# Patient Record
Sex: MALE | Race: WHITE | NOT HISPANIC OR LATINO | Employment: OTHER | URBAN - METROPOLITAN AREA
[De-identification: names, ages, dates, MRNs, and addresses within clinical notes are randomized per-mention and may not be internally consistent; named-entity substitution may affect disease eponyms.]

---

## 2021-01-04 ENCOUNTER — HISTORICAL (OUTPATIENT)
Dept: ADMINISTRATIVE | Facility: HOSPITAL | Age: 56
End: 2021-01-04

## 2021-01-04 LAB
CK SERPL-CCNC: 69 U/L (ref 30–200)
HCO3 UR-SCNC: 27.9 MMOL/L (ref 22–26)
O2 HGB ARTERIAL: 94.1 % (ref 94–97)
PCO2 BLDA: 42 MMHG (ref 35–45)
PH SMN: 7.43 [PH] (ref 7.35–7.45)
PO2 BLDA: 79 MMHG (ref 80–100)
POC ALLENS TEST: ABNORMAL
POC BE: 3.2 (ref -2–3)
POC CAO2: 16.3 ML/DL (ref 17.6–24.3)
POC CO HGB: 1.6 %
POC CO2: 29.2 MMOL/L (ref 22–27)
POC IONIZED CALCIUM: 1.14 MMOL/L (ref 1.12–1.23)
POC MET HGB: 1 % (ref 0.4–1.5)
POC SAMPLESOURCE: ABNORMAL
POC SATURATED O2: 95.9 % (ref 96–97)
POC SITE: ABNORMAL
POC THB: 12.3 GM/DL (ref 13.5–18)
POC TREATMENT: ABNORMAL
POTASSIUM BLD-SCNC: 4 MMOL/L (ref 3.6–5)
SODIUM BLD-SCNC: 134 MMOL/L (ref 137–145)
TSH SERPL-ACNC: 4.09 UIU/ML (ref 0.35–4.94)

## 2022-04-11 ENCOUNTER — HISTORICAL (OUTPATIENT)
Dept: ADMINISTRATIVE | Facility: HOSPITAL | Age: 57
End: 2022-04-11
Payer: COMMERCIAL

## 2022-04-28 VITALS
BODY MASS INDEX: 42.66 KG/M2 | DIASTOLIC BLOOD PRESSURE: 90 MMHG | WEIGHT: 315 LBS | SYSTOLIC BLOOD PRESSURE: 154 MMHG | HEIGHT: 72 IN

## 2022-05-02 ENCOUNTER — TELEPHONE (OUTPATIENT)
Dept: NEUROLOGY | Facility: CLINIC | Age: 57
End: 2022-05-02
Payer: COMMERCIAL

## 2022-05-02 NOTE — TELEPHONE ENCOUNTER
S/w pharm; aware that the modafinil PA w req for #60/30 (that they initiated to provider through [facility]) was approved; she states that they were able to process 30 for 15 but not the full Rx; suggested that they call help desk w insurance for any issues

## 2022-08-15 DIAGNOSIS — G47.30 SLEEP APNEA, UNSPECIFIED TYPE: Primary | ICD-10-CM

## 2022-08-15 RX ORDER — MODAFINIL 200 MG/1
200 TABLET ORAL 2 TIMES DAILY
Qty: 60 TABLET | Refills: 5 | Status: SHIPPED | OUTPATIENT
Start: 2022-08-15 | End: 2022-09-14

## 2022-08-15 RX ORDER — MODAFINIL 200 MG/1
200 TABLET ORAL 2 TIMES DAILY
COMMUNITY
Start: 2022-07-26 | End: 2022-08-15 | Stop reason: SDUPTHER

## 2022-08-15 NOTE — TELEPHONE ENCOUNTER
Spoke with patient. States he is leaving for work on Wednesday and is out of medication. Asking that medication be sent to [facility] pharmacy in [place] today.

## 2022-08-15 NOTE — TELEPHONE ENCOUNTER
----- Message from Nathalia Bella sent at 8/15/2022  9:00 AM CDT -----  Regarding: med refill  CallType: Patient Call  To: [facility]   From: Markie Mcl   Phone: [phone]   Patient name: Same   : 2. 2. 65   Reg Dr: Dr Dmitry Roberts   Ref: refill on meds - pharmacy has  been waiting    Clr ID: [id number]    --------------------------------------  Message History  Account: [id number]  Taken:  Mon 15-Aug-2022  7:41a TAB  Serial#: 1

## 2022-09-13 RX ORDER — GABAPENTIN 600 MG/1
600 TABLET ORAL 3 TIMES DAILY
COMMUNITY
Start: 2022-07-26

## 2022-09-13 RX ORDER — DEXTROAMPHETAMINE SACCHARATE, AMPHETAMINE ASPARTATE, DEXTROAMPHETAMINE SULFATE AND AMPHETAMINE SULFATE 5; 5; 5; 5 MG/1; MG/1; MG/1; MG/1
1 TABLET ORAL DAILY
COMMUNITY
Start: 2022-07-22 | End: 2022-10-17 | Stop reason: SDUPTHER

## 2022-09-13 RX ORDER — PHENTERMINE HYDROCHLORIDE 37.5 MG/1
TABLET ORAL
COMMUNITY
Start: 2022-01-12 | End: 2022-10-17 | Stop reason: SDUPTHER

## 2022-10-17 ENCOUNTER — OFFICE VISIT (OUTPATIENT)
Dept: NEUROLOGY | Facility: CLINIC | Age: 57
End: 2022-10-17
Payer: COMMERCIAL

## 2022-10-17 VITALS
BODY MASS INDEX: 34.88 KG/M2 | DIASTOLIC BLOOD PRESSURE: 82 MMHG | SYSTOLIC BLOOD PRESSURE: 124 MMHG | HEIGHT: 71 IN | WEIGHT: 249.13 LBS

## 2022-10-17 DIAGNOSIS — Z98.84 H/O GASTRIC BYPASS: ICD-10-CM

## 2022-10-17 DIAGNOSIS — E66.9 OBESITY (BMI 30.0-34.9): Primary | ICD-10-CM

## 2022-10-17 DIAGNOSIS — G47.33 OSA (OBSTRUCTIVE SLEEP APNEA): Primary | ICD-10-CM

## 2022-10-17 DIAGNOSIS — G70.9 NEUROMUSCULAR WEAKNESS: ICD-10-CM

## 2022-10-17 DIAGNOSIS — G47.19 EXCESSIVE DAYTIME SLEEPINESS: ICD-10-CM

## 2022-10-17 PROCEDURE — 3074F SYST BP LT 130 MM HG: CPT | Mod: CPTII,S$GLB,, | Performed by: NURSE PRACTITIONER

## 2022-10-17 PROCEDURE — 1159F PR MEDICATION LIST DOCUMENTED IN MEDICAL RECORD: ICD-10-PCS | Mod: CPTII,S$GLB,, | Performed by: NURSE PRACTITIONER

## 2022-10-17 PROCEDURE — 1160F PR REVIEW ALL MEDS BY PRESCRIBER/CLIN PHARMACIST DOCUMENTED: ICD-10-PCS | Mod: CPTII,S$GLB,, | Performed by: NURSE PRACTITIONER

## 2022-10-17 PROCEDURE — 3079F DIAST BP 80-89 MM HG: CPT | Mod: CPTII,S$GLB,, | Performed by: NURSE PRACTITIONER

## 2022-10-17 PROCEDURE — 99213 OFFICE O/P EST LOW 20 MIN: CPT | Mod: S$GLB,,, | Performed by: NURSE PRACTITIONER

## 2022-10-17 PROCEDURE — 3074F PR MOST RECENT SYSTOLIC BLOOD PRESSURE < 130 MM HG: ICD-10-PCS | Mod: CPTII,S$GLB,, | Performed by: NURSE PRACTITIONER

## 2022-10-17 PROCEDURE — 99999 PR PBB SHADOW E&M-EST. PATIENT-LVL IV: CPT | Mod: PBBFAC,,, | Performed by: NURSE PRACTITIONER

## 2022-10-17 PROCEDURE — 3079F PR MOST RECENT DIASTOLIC BLOOD PRESSURE 80-89 MM HG: ICD-10-PCS | Mod: CPTII,S$GLB,, | Performed by: NURSE PRACTITIONER

## 2022-10-17 PROCEDURE — 99999 PR PBB SHADOW E&M-EST. PATIENT-LVL IV: ICD-10-PCS | Mod: PBBFAC,,, | Performed by: NURSE PRACTITIONER

## 2022-10-17 PROCEDURE — 1159F MED LIST DOCD IN RCRD: CPT | Mod: CPTII,S$GLB,, | Performed by: NURSE PRACTITIONER

## 2022-10-17 PROCEDURE — 1160F RVW MEDS BY RX/DR IN RCRD: CPT | Mod: CPTII,S$GLB,, | Performed by: NURSE PRACTITIONER

## 2022-10-17 PROCEDURE — 99213 PR OFFICE/OUTPT VISIT, EST, LEVL III, 20-29 MIN: ICD-10-PCS | Mod: S$GLB,,, | Performed by: NURSE PRACTITIONER

## 2022-10-17 RX ORDER — CYANOCOBALAMIN 1000 UG/ML
INJECTION, SOLUTION INTRAMUSCULAR; SUBCUTANEOUS
COMMUNITY
Start: 2022-10-08

## 2022-10-17 RX ORDER — DEXTROAMPHETAMINE SACCHARATE, AMPHETAMINE ASPARTATE, DEXTROAMPHETAMINE SULFATE AND AMPHETAMINE SULFATE 5; 5; 5; 5 MG/1; MG/1; MG/1; MG/1
1 TABLET ORAL DAILY
Qty: 30 TABLET | Refills: 0 | Status: SHIPPED | OUTPATIENT
Start: 2022-10-17 | End: 2023-02-08 | Stop reason: SDUPTHER

## 2022-10-17 RX ORDER — MELOXICAM 15 MG/1
15 TABLET ORAL DAILY
COMMUNITY
Start: 2022-10-12

## 2022-10-17 RX ORDER — PHENTERMINE HYDROCHLORIDE 37.5 MG/1
TABLET ORAL
Qty: 60 TABLET | Refills: 0 | Status: SHIPPED | OUTPATIENT
Start: 2022-10-17 | End: 2022-12-12 | Stop reason: SDUPTHER

## 2022-10-17 RX ORDER — MODAFINIL 200 MG/1
200 TABLET ORAL 2 TIMES DAILY
Qty: 60 TABLET | Refills: 0 | Status: SHIPPED | OUTPATIENT
Start: 2022-10-17 | End: 2022-11-28

## 2022-10-17 RX ORDER — VILAZODONE HYDROCHLORIDE 40 MG/1
40 TABLET ORAL DAILY
COMMUNITY
Start: 2022-09-08

## 2022-10-17 RX ORDER — PSEUDOEPHEDRINE HCL 30 MG
250 TABLET ORAL
COMMUNITY
Start: 2022-04-12

## 2022-10-17 RX ORDER — ERGOCALCIFEROL 1.25 MG/1
50000 CAPSULE ORAL
COMMUNITY
Start: 2022-07-18

## 2022-10-17 RX ORDER — ACARBOSE 25 MG/1
25 TABLET ORAL 3 TIMES DAILY
COMMUNITY
Start: 2022-09-21

## 2022-10-17 RX ORDER — MODAFINIL 200 MG/1
200 TABLET ORAL 2 TIMES DAILY
COMMUNITY
Start: 2022-10-07 | End: 2022-10-17 | Stop reason: SDUPTHER

## 2022-10-17 NOTE — ASSESSMENT & PLAN NOTE
Encouraged continued use of PAP. Drowsy driving may still occur despite PAP use. Clinical follow up and replacement of supplies discussed.    DME: [facility]; discussed trilogy settings with RT Lexi Segovia during visit.     Patient transitioning to Medicare on 11/01/2022 and will need a visit with us at that time so I ask that he come back mid 12/2022 for face to face OV and new rx for triology.    FU in 2 months

## 2022-10-17 NOTE — PROGRESS NOTES
Subjective:           Patient ID: Markie Ashby is a 57 y.o. male.    Chief Complaint: Sleep Apnea     HPI:            Follow up 6 month; Trilogy beneficial to patient. Tolerating pressure well since change during last office visit. Pt feels refreshed when awakening. Denies difficulty falling asleep and reports less EDS. No issues with Trilogy.     Current Trilogy settings - changed/(optimized) 05/13/2022    Peep 5-8  PS 5-20 with max 25      Would like to discuss poss SS since weight loss; s/p gastric surgery; has lost 201 pounds; current weight 249!    EDS:  Provigil 200 mg BID  Adderall 20 mg qday  Phentermine 37.5 mg BID    EPSS on med:  EPWORTH SLEEPINESS SCALE 10/17/2022   Sitting and reading 1   Watching TV 2   Sitting, inactive in a public place (e.g. a theatre or a meeting) 2   As a passenger in a car for an hour without a break 3   Lying down to rest in the afternoon when circumstances permit 3   Sitting and talking to someone 1   Sitting quietly after a lunch without alcohol 2   In a car, while stopped for a few minutes in traffic 1   Total score 15     ROS: as per HPI, otherwise pertinent systems review is negative          Past Medical History:   Diagnosis Date    Diabetes mellitus     JOLENE (obstructive sleep apnea)        Past Surgical History:   Procedure Laterality Date    APPENDECTOMY      CHOLECYSTECTOMY      KYPHOSIS SURGERY      VASECTOMY         Family History   Problem Relation Age of Onset    Diabetes Mother     Cancer Brother        Social History     Socioeconomic History    Marital status:    Tobacco Use    Smoking status: Never    Smokeless tobacco: Never   Substance and Sexual Activity    Alcohol use: Never    Drug use: Never       Review of patient's allergies indicates:   Allergen Reactions    Oxycodone      Other reaction(s): haullincante    Penicillins Rash         Current Outpatient Medications:     acarbose (PRECOSE) 25 MG Tab, Take 25 mg by mouth 3 (three) times daily.,  "Disp: , Rfl:     calcium citrate 250 mg calcium Tab, Take 250 mg by mouth., Disp: , Rfl:     cyanocobalamin 1,000 mcg/mL injection, SMARTSI MCG IM, Disp: , Rfl:     ergocalciferol (ERGOCALCIFEROL) 50,000 unit Cap, Take 50,000 Units by mouth every 7 days., Disp: , Rfl:     gabapentin (NEURONTIN) 600 MG tablet, Take 600 mg by mouth 3 (three) times daily., Disp: , Rfl:     meloxicam (MOBIC) 15 MG tablet, Take 15 mg by mouth once daily., Disp: , Rfl:     phentermine (ADIPEX-P) 37.5 mg tablet,  See Instructions, TAKE ONE TABLET BY MOUTH TWICE DAILY, # 60 tab(s), 5 Refill(s), Pharmacy: [facility], 184, cm, Height/Length Dosing, 22 6:47:00 CST, 144.69, kg, Weight Dosing, 22 6:47:00 CST, Disp: , Rfl:     vilazodone (VIIBRYD) 40 mg Tab tablet, Take 40 mg by mouth once daily., Disp: , Rfl:     dextroamphetamine-amphetamine (ADDERALL) 20 mg tablet, Take 1 tablet by mouth once daily., Disp: 30 tablet, Rfl: 0    modafiniL (PROVIGIL) 200 MG Tab, Take 1 tablet (200 mg total) by mouth 2 (two) times daily., Disp: 60 tablet, Rfl: 0         Objective:      Exam:   /82 (BP Location: Right arm, Patient Position: Sitting)   Ht 5' 11" (1.803 m)   BMI 44.49 kg/m²     Physical Exam  Vitals reviewed.   Constitutional:       Appearance: Normal appearance.      Accompanied by: alone  HENT:      Ears:      Comments: Hearing normal.  Eyes:      Extraocular Movements: Extraocular movements intact.   Cardiovascular:      Rate and Rhythm: Normal rate and regular rhythm.   Pulmonary:      Effort: Pulmonary effort is normal.      Breath sounds: Normal breath sounds.   Musculoskeletal:         General: Normal range of motion.   Skin:     General: Skin is warm and dry.   Neurological:      General: No focal deficit present.      Mental Status: He is alert and oriented to person, place, and time.      Gait standing rollator  Psychiatric:         Mood and Affect: Mood normal.         Behavior: Behavior normal. "         Assessment/Plan:     Problem List Items Addressed This Visit          Neuro    Neuromuscular weakness       Endocrine    H/O gastric bypass       Other    JOLENE (obstructive sleep apnea) - Primary    Current Assessment & Plan     Encouraged continued use of PAP. Drowsy driving may still occur despite PAP use. Clinical follow up and replacement of supplies discussed.    DME: [facility]; discussed trilogy settings with RT Lexi Segovia during visit.     Patient transitioning to Medicare on 11/01/2022 and will need a visit with us at that time so I ask that he come back mid 12/2022 for face to face OV and new rx for triology.    FU in 2 months            Excessive daytime sleepiness    Current Assessment & Plan     Advise he not drive if too drowsy    Continue the Adderall, Provigil, and Phentermine [phentermine rx from Dr. Roberts]              Juventino Kelley, MSN, APRN, AGACNP-BC

## 2022-10-17 NOTE — ASSESSMENT & PLAN NOTE
Advise he not drive if too drowsy    Continue the Adderall, Provigil, and Phentermine [phentermine rx from Dr. Roberts]

## 2022-12-12 DIAGNOSIS — E66.9 OBESITY (BMI 30.0-34.9): ICD-10-CM

## 2022-12-13 RX ORDER — PHENTERMINE HYDROCHLORIDE 37.5 MG/1
TABLET ORAL
Qty: 60 TABLET | Refills: 0 | Status: SHIPPED | OUTPATIENT
Start: 2022-12-13 | End: 2023-01-11

## 2023-01-11 DIAGNOSIS — E66.9 OBESITY (BMI 30.0-34.9): ICD-10-CM

## 2023-01-11 RX ORDER — PHENTERMINE HYDROCHLORIDE 37.5 MG/1
TABLET ORAL
Qty: 60 TABLET | Refills: 0 | Status: SHIPPED | OUTPATIENT
Start: 2023-01-11 | End: 2023-02-08 | Stop reason: SDUPTHER

## 2023-01-27 DIAGNOSIS — G47.33 OSA (OBSTRUCTIVE SLEEP APNEA): ICD-10-CM

## 2023-01-27 DIAGNOSIS — G47.19 EXCESSIVE DAYTIME SLEEPINESS: ICD-10-CM

## 2023-01-27 RX ORDER — MODAFINIL 200 MG/1
200 TABLET ORAL 2 TIMES DAILY
Qty: 60 TABLET | Refills: 3 | Status: SHIPPED | OUTPATIENT
Start: 2023-01-27 | End: 2023-02-08 | Stop reason: SDUPTHER

## 2023-02-08 ENCOUNTER — OFFICE VISIT (OUTPATIENT)
Dept: NEUROLOGY | Facility: CLINIC | Age: 58
End: 2023-02-08
Payer: COMMERCIAL

## 2023-02-08 VITALS
BODY MASS INDEX: 32.9 KG/M2 | WEIGHT: 235 LBS | DIASTOLIC BLOOD PRESSURE: 72 MMHG | SYSTOLIC BLOOD PRESSURE: 124 MMHG | HEIGHT: 71 IN

## 2023-02-08 DIAGNOSIS — G70.9 NEUROMUSCULAR WEAKNESS: ICD-10-CM

## 2023-02-08 DIAGNOSIS — E66.9 OBESITY (BMI 30.0-34.9): ICD-10-CM

## 2023-02-08 DIAGNOSIS — G47.33 OSA (OBSTRUCTIVE SLEEP APNEA): Primary | ICD-10-CM

## 2023-02-08 DIAGNOSIS — Z98.84 H/O GASTRIC BYPASS: ICD-10-CM

## 2023-02-08 DIAGNOSIS — G47.19 EXCESSIVE DAYTIME SLEEPINESS: ICD-10-CM

## 2023-02-08 PROCEDURE — 3078F DIAST BP <80 MM HG: CPT | Mod: CPTII,S$GLB,, | Performed by: NURSE PRACTITIONER

## 2023-02-08 PROCEDURE — 1159F MED LIST DOCD IN RCRD: CPT | Mod: CPTII,S$GLB,, | Performed by: NURSE PRACTITIONER

## 2023-02-08 PROCEDURE — 99215 OFFICE O/P EST HI 40 MIN: CPT | Mod: S$GLB,,, | Performed by: NURSE PRACTITIONER

## 2023-02-08 PROCEDURE — 3008F BODY MASS INDEX DOCD: CPT | Mod: CPTII,S$GLB,, | Performed by: NURSE PRACTITIONER

## 2023-02-08 PROCEDURE — 3074F SYST BP LT 130 MM HG: CPT | Mod: CPTII,S$GLB,, | Performed by: NURSE PRACTITIONER

## 2023-02-08 PROCEDURE — 3074F PR MOST RECENT SYSTOLIC BLOOD PRESSURE < 130 MM HG: ICD-10-PCS | Mod: CPTII,S$GLB,, | Performed by: NURSE PRACTITIONER

## 2023-02-08 PROCEDURE — 3008F PR BODY MASS INDEX (BMI) DOCUMENTED: ICD-10-PCS | Mod: CPTII,S$GLB,, | Performed by: NURSE PRACTITIONER

## 2023-02-08 PROCEDURE — 3078F PR MOST RECENT DIASTOLIC BLOOD PRESSURE < 80 MM HG: ICD-10-PCS | Mod: CPTII,S$GLB,, | Performed by: NURSE PRACTITIONER

## 2023-02-08 PROCEDURE — 99999 PR PBB SHADOW E&M-EST. PATIENT-LVL III: ICD-10-PCS | Mod: PBBFAC,,, | Performed by: NURSE PRACTITIONER

## 2023-02-08 PROCEDURE — 99215 PR OFFICE/OUTPT VISIT, EST, LEVL V, 40-54 MIN: ICD-10-PCS | Mod: S$GLB,,, | Performed by: NURSE PRACTITIONER

## 2023-02-08 PROCEDURE — 99999 PR PBB SHADOW E&M-EST. PATIENT-LVL III: CPT | Mod: PBBFAC,,, | Performed by: NURSE PRACTITIONER

## 2023-02-08 PROCEDURE — 1159F PR MEDICATION LIST DOCUMENTED IN MEDICAL RECORD: ICD-10-PCS | Mod: CPTII,S$GLB,, | Performed by: NURSE PRACTITIONER

## 2023-02-08 RX ORDER — PHENTERMINE HYDROCHLORIDE 37.5 MG/1
TABLET ORAL
Qty: 60 TABLET | Refills: 5 | Status: SHIPPED | OUTPATIENT
Start: 2023-02-08 | End: 2023-05-09 | Stop reason: SDUPTHER

## 2023-02-08 RX ORDER — DEXTROAMPHETAMINE SACCHARATE, AMPHETAMINE ASPARTATE, DEXTROAMPHETAMINE SULFATE AND AMPHETAMINE SULFATE 5; 5; 5; 5 MG/1; MG/1; MG/1; MG/1
1 TABLET ORAL DAILY
Qty: 30 TABLET | Refills: 0 | Status: SHIPPED | OUTPATIENT
Start: 2023-02-08

## 2023-02-08 RX ORDER — MODAFINIL 200 MG/1
200 TABLET ORAL 2 TIMES DAILY
Qty: 60 TABLET | Refills: 5 | Status: SHIPPED | OUTPATIENT
Start: 2023-02-08 | End: 2023-05-09 | Stop reason: SDUPTHER

## 2023-02-08 NOTE — PROGRESS NOTES
Established JOLENE Patient   SUBJECTIVE:    Patient ID: Markie Ashby , 58 y.o.    Past Medical History:   Diagnosis Date    Diabetes mellitus     JOLENE (obstructive sleep apnea)        Past Surgical History:   Procedure Laterality Date    APPENDECTOMY      CHOLECYSTECTOMY      KYPHOSIS SURGERY      VASECTOMY         Family History   Problem Relation Age of Onset    Diabetes Mother     Cancer Brother        Social History     Socioeconomic History    Marital status:    Tobacco Use    Smoking status: Never    Smokeless tobacco: Never   Substance and Sexual Activity    Alcohol use: Never    Drug use: Never   Social History Narrative    ** Merged History Encounter **            Review of patient's allergies indicates:   Allergen Reactions    Oxycodone      Other reaction(s): haullincante    Thiopental sodium      Other reaction(s): Not Indicated    Penicillins Rash       Chief Complaint: Here for jolene f/u, on trilogy     History of Present Illness:     Follow up 6 month; Trilogy beneficial to patient.  Denies problems with trilogy machine. Able to get supplies    Current Trilogy settings - changed/(optimized) 2022    Peep 5-8  PS 5-20 with max 25      Reports EDS is well controlled with medication  EDS:  Provigil 200 mg BID  Adderall 20 mg qday  Phentermine 37.5 mg BID    Had weight loss surgery in 10/2021. Has lost 210 lbs!    Had fall; awaiting spine surgery 2023 with     Review of Systems - as per HPI, otherwise a balanced 10 systems review is negative.      Current Medications:    Current Outpatient Medications:     calcium citrate 250 mg calcium Tab, Take 250 mg by mouth., Disp: , Rfl:     cyanocobalamin 1,000 mcg/mL injection, SMARTSI MCG IM, Disp: , Rfl:     dextroamphetamine-amphetamine (ADDERALL) 20 mg tablet, Take 1 tablet by mouth once daily., Disp: 30 tablet, Rfl: 0    ergocalciferol (ERGOCALCIFEROL) 50,000 unit Cap, Take 50,000 Units by mouth every 7 days., Disp: , Rfl:  "    gabapentin (NEURONTIN) 600 MG tablet, Take 600 mg by mouth 3 (three) times daily., Disp: , Rfl:     meloxicam (MOBIC) 15 MG tablet, Take 15 mg by mouth once daily., Disp: , Rfl:     modafiniL (PROVIGIL) 200 MG Tab, Take 1 tablet (200 mg total) by mouth 2 (two) times daily., Disp: 60 tablet, Rfl: 3    multivitamin with minerals tablet, Take 1 tablet by mouth., Disp: , Rfl:     phentermine (ADIPEX-P) 37.5 mg tablet, SEE INSTRUCTIONS, TAKE ONE TABLET BY MOUTH TWICE DAILY, # 60 TAB(S), 5 REFILL(S), PHARMACY: [facility], 184, CM, HEIGHT<MORE>, Disp: 60 tablet, Rfl: 0    vilazodone (VIIBRYD) 40 mg Tab tablet, Take 40 mg by mouth once daily., Disp: , Rfl:     acarbose (PRECOSE) 25 MG Tab, Take 25 mg by mouth 3 (three) times daily., Disp: , Rfl:       OBJECTIVE:    Vitals:  /72 (BP Location: Left arm, Patient Position: Sitting)   Ht 5' 11" (1.803 m)   Wt 106.6 kg (235 lb)   BMI 32.78 kg/m²      Physical Exam:  Constitutional  he appears well-developed and well-nourished. he is well groomed.    Accompanied by - self  Appearance -  no apparent distress  Heart - RRR auscultated without murmur  Lungs - CTA   Skin- wearing back brace    Neurologic  Cortical function - The patient is alert, attentive, and oriented  Speech - clear   Cranial nerves:  CN 3, 4, 6 EOMs - normal. No ptosis or lateral gaze deviation  CN 7 - no face asymmetry; normal eye closure and smile  CN 8 - hearing is grossly normal  Motor - grossly normal  Gait - walks with cane    Review of Data:      PAP Compliance Report  Last 30 days  Usage-  Usage > 4 hrs -  AHI -    EPWORTH SLEEPINESS SCALE 2/8/2023   Sitting and reading 1   Watching TV 2   Sitting, inactive in a public place (e.g. a theatre or a meeting) 1   As a passenger in a car for an hour without a break 1   Lying down to rest in the afternoon when circumstances permit 2   Sitting and talking to someone 1   Sitting quietly after a lunch without alcohol 2   In a car, while " stopped for a few minutes in traffic 1   Total score 11       Labs:  No visits with results within 3 Month(s) from this visit.   Latest known visit with results is:   Historical on 01/04/2021   Component Date Value Ref Range Status    POC CO HGB 01/04/2021 001.6  % Final    POC Potassium 01/04/2021 4.00  3.60 - 5.00 mmol/L Final    POC PH 01/04/2021 7.430  7.350 - 7.450 Final    POC BE 01/04/2021 3.2 (H)  -2.0 - 3.0 Final    POC MET HGB 01/04/2021 1.0  0.4 - 1.5 % Final    POC Ionized Calcium 01/04/2021 1.14  1.12 - 1.23 mmol/L Final    POC Sodium 01/04/2021 134.0 (L)  137.0 - 145.0 mmol/L Final    Sample Source 01/04/2021 art   Final    POC PCO2 01/04/2021 42.0  35.0 - 45.0 mmHg Final    POC SATURATED O2 01/04/2021 95.9 (L)  96.0 - 97.0 % Final    POC CAO2 01/04/2021 16.3 (L)  17.6 - 24.3 mL/dL Final    Allens Test 01/04/2021 N/A   Final    POC PO2 01/04/2021 79.0 (L)  80.0 - 100.0 mmHg Final    POC THb 01/04/2021 12.3 (L)  13.5 - 18.0 gm/dL Final    Site 01/04/2021 Brachial Lt   Final    POC HCO3 01/04/2021 27.9 (H)  22.0 - 26.0 mmol/L Final    O2 HGB, Arterial 01/04/2021 94.1  94.0 - 97.0 % Final    POC TREATMENT 01/04/2021 RA   Final    POC CO2 01/04/2021 29.2 (H)  22.0 - 27.0 mmol/L Final    Thyroid Stimulating Hormone 01/04/2021 4.0880  0.3500 - 4.9400 uIU/mL Final    Creatine Kinase 01/04/2021 69  30 - 200 U/L Final          ASSESSMENT /PLAN:    Problem List Items Addressed This Visit          Neuro    Neuromuscular weakness    - Awaiting spine surgery 6/7/2023 with   - Fall precautions discussed         Endocrine    H/O gastric bypass    - Keep up great work with weight loss!         Other    JOLENE (obstructive sleep apnea) - Primary    - Encouraged continued use of PAP.   - Drowsy driving may still occur despite PAP use.       Excessive daytime sleepiness    Continue medication for EDS:  Provigil 200 mg BID  Adderall 20 mg qday  Phentermine 37.5 mg BID           Questions and concerns were sought and  answered to the patient's stated verbal satisfaction.    The patient verbalizes understanding and agreement with the above stated treatment plan.   Dr. Roberts was available during today's encounter.     Items discussed include acute and/or chronic neurological, sleep, or other issues and their attendant differential diagnoses.  Potential for additional testing, treatment options, and prognosis also discussed.    ___single dx _*__multiple issues/ diagnoses  ___ low __ mod __  *_ high complexity of data  ___low __mod __*_ high risks     Medical Decision Making (MDM) used for CPT choice:  ___low  ___moderate  __  *__high        TONJA ManceraC  [facility]  [phone]

## 2023-05-09 ENCOUNTER — OFFICE VISIT (OUTPATIENT)
Dept: NEUROLOGY | Facility: CLINIC | Age: 58
End: 2023-05-09
Payer: MEDICARE

## 2023-05-09 VITALS
BODY MASS INDEX: 34.44 KG/M2 | DIASTOLIC BLOOD PRESSURE: 72 MMHG | HEIGHT: 71 IN | SYSTOLIC BLOOD PRESSURE: 134 MMHG | WEIGHT: 246 LBS

## 2023-05-09 DIAGNOSIS — E66.9 OBESITY (BMI 30.0-34.9): ICD-10-CM

## 2023-05-09 DIAGNOSIS — G47.19 EXCESSIVE DAYTIME SLEEPINESS: ICD-10-CM

## 2023-05-09 DIAGNOSIS — Z98.84 H/O GASTRIC BYPASS: ICD-10-CM

## 2023-05-09 DIAGNOSIS — E66.2 OBESITY HYPOVENTILATION SYNDROME: ICD-10-CM

## 2023-05-09 DIAGNOSIS — J96.11 CHRONIC RESPIRATORY FAILURE WITH HYPOXIA: ICD-10-CM

## 2023-05-09 DIAGNOSIS — G47.33 OSA (OBSTRUCTIVE SLEEP APNEA): Primary | ICD-10-CM

## 2023-05-09 PROBLEM — E66.811 OBESITY (BMI 30.0-34.9): Status: ACTIVE | Noted: 2023-05-09

## 2023-05-09 PROCEDURE — 99999 PR PBB SHADOW E&M-EST. PATIENT-LVL III: CPT | Mod: PBBFAC,,, | Performed by: NURSE PRACTITIONER

## 2023-05-09 PROCEDURE — 99999 PR PBB SHADOW E&M-EST. PATIENT-LVL III: ICD-10-PCS | Mod: PBBFAC,,, | Performed by: NURSE PRACTITIONER

## 2023-05-09 PROCEDURE — 99215 PR OFFICE/OUTPT VISIT, EST, LEVL V, 40-54 MIN: ICD-10-PCS | Mod: S$PBB,,, | Performed by: NURSE PRACTITIONER

## 2023-05-09 PROCEDURE — 99215 OFFICE O/P EST HI 40 MIN: CPT | Mod: S$PBB,,, | Performed by: NURSE PRACTITIONER

## 2023-05-09 PROCEDURE — 99213 OFFICE O/P EST LOW 20 MIN: CPT | Mod: PBBFAC | Performed by: NURSE PRACTITIONER

## 2023-05-09 RX ORDER — PHENTERMINE HYDROCHLORIDE 37.5 MG/1
TABLET ORAL
Qty: 60 TABLET | Refills: 5 | Status: SHIPPED | OUTPATIENT
Start: 2023-05-09 | End: 2023-11-27

## 2023-05-09 RX ORDER — VITAMIN A 3000 MCG
10000 CAPSULE ORAL DAILY
COMMUNITY

## 2023-05-09 RX ORDER — MODAFINIL 200 MG/1
200 TABLET ORAL 2 TIMES DAILY
Qty: 60 TABLET | Refills: 5 | Status: SHIPPED | OUTPATIENT
Start: 2023-05-09 | End: 2023-11-29 | Stop reason: SDUPTHER

## 2023-05-09 RX ORDER — NICOTINE POLACRILEX 2 MG
GUM BUCCAL
COMMUNITY

## 2023-05-09 NOTE — PROGRESS NOTES
"  Established JOLENE Patient   SUBJECTIVE:    Patient ID: Markie Ashby , 58 y.o.    Past Medical History:   Diagnosis Date    Diabetes mellitus     JOLENE (obstructive sleep apnea)        Past Surgical History:   Procedure Laterality Date    APPENDECTOMY      CHOLECYSTECTOMY      KYPHOSIS SURGERY      VASECTOMY         Review of patient's allergies indicates:   Allergen Reactions    Oxycodone      Other reaction(s): haullincante    Thiopental sodium      Other reaction(s): Not Indicated    Penicillins Rash       Chief Complaint: JOLENE f/u    History of Present Illness:     Face to face visit for JOLENE on NIV, due to recent change in insurance (now on Medicare). [employer]/ Medicare needing updated RX and office note with a continued use statement for his trilogy.    58 y/O male, with complex JOLENE. In the past, tried CPAP and BIPAP; unable to tolerate. With repeat in lab titration, required trilogy machine settings. Since on the trilogy machine, reports feeling better upon awakening. Reports "Its the best thing that's ever happened to me!"    Wears Trilogy nightly and is tolerating it well. Reports he sleeps better with machine. Sleeps 4-6 hrs a night and sleeps all night. Awakens refreshed and has EDS. Does not nap.     Changes mask and tubing on a regular basis. Denies any issues with the mask or the equipment. DME-[employer]. Pt did not bring SD card.     Takes Adipex 37.5 mg, 1 BID + Provigil 200mg, 1 BID + Adderall 20mg, 1 Qam. 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 Asking if we are willing to take over. Does not take daily since he is now retired. Reports continues with EDS despite taking stimulants. Reports yesterday he took medication and had to drive wife to work. After dropping her off, pulled over in parking lot and took nap. Reports hes never had a MSLT    Goes to bed around 12pm-1am and awakens around 5:30-6am    Reports having a laminectomy in June with  in [place]    Frequent " "falls due to LLE wkns (secondary to lumbar spine disease)    Review of Systems - as per HPI, otherwise a balanced 10 systems review is negative.      Current Medications:  Current Outpatient Medications   Medication Instructions    acarbose (PRECOSE) 25 mg, Oral, 3 times daily    biotin 1 mg Cap Oral    calcium citrate 250 mg, Oral    cyanocobalamin 1,000 mcg/mL injection SMARTSI MCG IM    dextroamphetamine-amphetamine (ADDERALL) 20 mg tablet 1 tablet, Oral, Daily    ergocalciferol (ERGOCALCIFEROL) 50,000 Units, Oral, Every 7 days    gabapentin (NEURONTIN) 600 mg, Oral, 3 times daily    meloxicam (MOBIC) 15 mg, Oral, Daily    modafiniL (PROVIGIL) 200 mg, Oral, 2 times daily    multivitamin with minerals tablet 1 tablet, Oral    phentermine (ADIPEX-P) 37.5 mg tablet SEE INSTRUCTIONS, TAKE ONE TABLET BY MOUTH TWICE DAILY, # 60 TAB(S), 5 REFILL(S), PHARMACY: [facility], 184, CM, HEIGHT<MORE>    vilazodone (VIIBRYD) 40 mg, Oral, Daily    vitamin A 10,000 Units, Oral, Daily         OBJECTIVE:    Vitals:  /72   Ht 5' 11" (1.803 m)   Wt 111.6 kg (246 lb)   BMI 34.31 kg/m²      Physical Exam:  Constitutional  he appears well-developed and well-nourished. he is well groomed.    Accompanied by - self  Appearance - well appearing, no apparent distress   Heart - RRR auscultated without murmur  Lungs - CTA   Skin- no obvious lesions noted    Neurologic  Cortical function - The patient is alert, attentive, and oriented  Speech - clear   Cranial nerves:  CN 3, 4, 6 EOMs - normal. No ptosis or lateral gaze deviation  CN 7 - no face asymmetry; normal eye closure and smile  CN 8 - hearing is grossly normal  Motor - grossly normal  Gait - cane; posture upright. gait is cautious with normal steps    Review of Data:    [place] today 21         ASSESSMENT /PLAN:    Problem List Items Addressed This Visit                         Chronic Respiratory Failure 2' to Obesity Hypoventilation Syndrome  Complex JOLENE " (obstructive sleep apnea)      - Will request download from [employer]    - Patient reports compliance and is benefiting from his home noninvasive volume ventilator. Recommend that he continues his home noninvasive volume ventilator during hours of sleep as well as waking hours when symptomatic. A Home CPAP/BIPAP is NOT appropriate for meeting this patients ventilatory requirement.    - Drowsy driving may still occur despite PAP use.   - F/u in 6 mo      Excessive daytime sleepiness    Provigil 200mg, 1 BID     Stop Adderall 20mg. Do not feel its safe with Provigil and Adipex    Drowsy driving may still occur despite PAP use.     Discussed ordering a MSLT   - Would need to have a PAP titration prior to ensure hes at an adequate pressure   - 2 wks prior to MSLT would have to stop both Adipex and Provigil   - After titration, would do a PAP night (at appropriate pressure) then MSLT the following day   - Pt will call if he decides to proceed with testing    Also discussed that if he could tolerate a smaller dose of Gabapentin, his degree of EDS could improve    Obesity  Continue Adipex 37.5 mg, 1 BID   (prescribed by )  Encouraged exercise at least 3 times per week, 30 minutes each time. Drink at least half of your body weight in ounces of water. Eat more fruits and vegetables. Avoid sugary foods and soda.      H/O gastric bypass           Questions and concerns were sought and answered to the patient's stated verbal satisfaction.    The patient verbalizes understanding and agreement with the above stated treatment plan.   Dr. Roberts was available during today's encounter.     Items discussed include acute and/or chronic neurological, sleep, or other issues and their attendant differential diagnoses.  Potential for additional testing, treatment options, and prognosis also discussed.    ___single dx __*_multiple issues/ diagnoses  ___ low __ mod __*_ high complexity of data  ___low __mod __*_ high risks      Medical Decision Making (MDM) used for CPT choice:  ___low  ___moderate  ___*_high        ROBI Mancera  [facility]  [phone]

## 2023-10-20 ENCOUNTER — HOSPITAL ENCOUNTER (OUTPATIENT)
Dept: RADIOLOGY | Facility: HOSPITAL | Age: 58
Discharge: HOME | End: 2023-10-20
Attending: NEUROLOGICAL SURGERY
Payer: MEDICARE

## 2023-10-20 DIAGNOSIS — L76.34 POSTPROCEDURAL SEROMA OF SKIN AND SUBCUTANEOUS TISSUE FOLLOWING OTHER PROCEDURE: ICD-10-CM

## 2023-10-20 PROCEDURE — 87070 CULTURE OTHR SPECIMN AEROBIC: CPT | Performed by: NEUROLOGICAL SURGERY

## 2023-10-20 PROCEDURE — 86335 IMMUNFIX E-PHORSIS/URINE/CSF: CPT | Performed by: NEUROLOGICAL SURGERY

## 2023-10-20 PROCEDURE — 88173 CYTOPATH EVAL FNA REPORT: CPT

## 2023-10-20 PROCEDURE — 76942 ECHO GUIDE FOR BIOPSY: CPT | Mod: TC

## 2023-10-20 PROCEDURE — 87075 CULTR BACTERIA EXCEPT BLOOD: CPT | Performed by: NEUROLOGICAL SURGERY

## 2023-10-20 PROCEDURE — 10160 PNXR ASPIR ABSC HMTMA BULLA: CPT

## 2023-10-20 PROCEDURE — 88305 TISSUE EXAM BY PATHOLOGIST: CPT | Performed by: NEUROLOGICAL SURGERY

## 2023-10-23 LAB
B2 TRANSFERRIN FLD QL: NEGATIVE
BACTERIA SPEC ANAEROBE CULT: NORMAL
PSYCHE PATHOLOGY RESULT: NORMAL

## 2023-10-25 LAB — BACTERIA FLD CULT: NORMAL

## 2023-11-03 ENCOUNTER — PATIENT MESSAGE (OUTPATIENT)
Dept: NEUROLOGY | Facility: CLINIC | Age: 58
End: 2023-11-03
Payer: MEDICARE

## 2023-11-05 ENCOUNTER — PATIENT MESSAGE (OUTPATIENT)
Dept: NEUROLOGY | Facility: CLINIC | Age: 58
End: 2023-11-05
Payer: MEDICARE

## 2023-11-07 NOTE — TELEPHONE ENCOUNTER
I reached out to patient and changed his visit to a telemed for next Thursday 11/16 @ 3pm with Juventino

## 2023-11-09 ENCOUNTER — PATIENT MESSAGE (OUTPATIENT)
Dept: NEUROLOGY | Facility: CLINIC | Age: 58
End: 2023-11-09
Payer: MEDICARE

## 2023-11-21 ENCOUNTER — LAB REQUISITION (OUTPATIENT)
Dept: LAB | Facility: HOSPITAL | Age: 58
End: 2023-11-21
Payer: MEDICARE

## 2023-11-21 DIAGNOSIS — E66.01 MORBID (SEVERE) OBESITY DUE TO EXCESS CALORIES: ICD-10-CM

## 2023-11-21 LAB
ALBUMIN SERPL-MCNC: 3.8 G/DL (ref 3.5–5)
ALBUMIN/GLOB SERPL: 1.4 RATIO (ref 1.1–2)
ALP SERPL-CCNC: 102 UNIT/L (ref 40–150)
ALT SERPL-CCNC: 36 UNIT/L (ref 0–55)
AST SERPL-CCNC: 43 UNIT/L (ref 5–34)
BASOPHILS # BLD AUTO: 0.05 X10(3)/MCL
BASOPHILS NFR BLD AUTO: 1.1 %
BILIRUB SERPL-MCNC: 0.4 MG/DL
BUN SERPL-MCNC: 12.1 MG/DL (ref 8.4–25.7)
CALCIUM SERPL-MCNC: 8.3 MG/DL (ref 8.4–10.2)
CHLORIDE SERPL-SCNC: 104 MMOL/L (ref 98–107)
CK SERPL-CCNC: 24 U/L (ref 30–200)
CO2 SERPL-SCNC: 27 MMOL/L (ref 22–29)
CREAT SERPL-MCNC: 0.77 MG/DL (ref 0.73–1.18)
CRP SERPL-MCNC: 9.2 MG/L
EOSINOPHIL # BLD AUTO: 0.42 X10(3)/MCL (ref 0–0.9)
EOSINOPHIL NFR BLD AUTO: 9.4 %
ERYTHROCYTE [DISTWIDTH] IN BLOOD BY AUTOMATED COUNT: 14.7 % (ref 11.5–17)
ERYTHROCYTE [SEDIMENTATION RATE] IN BLOOD: 17 MM/HR (ref 0–15)
GFR SERPLBLD CREATININE-BSD FMLA CKD-EPI: >60 MLS/MIN/1.73/M2
GLOBULIN SER-MCNC: 2.8 GM/DL (ref 2.4–3.5)
GLUCOSE SERPL-MCNC: 82 MG/DL (ref 74–100)
HCT VFR BLD AUTO: 31.9 % (ref 42–52)
HGB BLD-MCNC: 10 G/DL (ref 14–18)
IMM GRANULOCYTES # BLD AUTO: 0.02 X10(3)/MCL (ref 0–0.04)
IMM GRANULOCYTES NFR BLD AUTO: 0.4 %
LYMPHOCYTES # BLD AUTO: 1.11 X10(3)/MCL (ref 0.6–4.6)
LYMPHOCYTES NFR BLD AUTO: 24.9 %
MCH RBC QN AUTO: 30.6 PG (ref 27–31)
MCHC RBC AUTO-ENTMCNC: 31.3 G/DL (ref 33–36)
MCV RBC AUTO: 97.6 FL (ref 80–94)
MONOCYTES # BLD AUTO: 0.48 X10(3)/MCL (ref 0.1–1.3)
MONOCYTES NFR BLD AUTO: 10.8 %
NEUTROPHILS # BLD AUTO: 2.37 X10(3)/MCL (ref 2.1–9.2)
NEUTROPHILS NFR BLD AUTO: 53.4 %
NRBC BLD AUTO-RTO: 0 %
PLATELET # BLD AUTO: 193 X10(3)/MCL (ref 130–400)
PMV BLD AUTO: 8.7 FL (ref 7.4–10.4)
POTASSIUM SERPL-SCNC: 3.9 MMOL/L (ref 3.5–5.1)
PROT SERPL-MCNC: 6.6 GM/DL (ref 6.4–8.3)
RBC # BLD AUTO: 3.27 X10(6)/MCL (ref 4.7–6.1)
SODIUM SERPL-SCNC: 139 MMOL/L (ref 136–145)
WBC # SPEC AUTO: 4.45 X10(3)/MCL (ref 4.5–11.5)

## 2023-11-21 PROCEDURE — 80053 COMPREHEN METABOLIC PANEL: CPT | Performed by: SURGERY

## 2023-11-21 PROCEDURE — 85652 RBC SED RATE AUTOMATED: CPT | Performed by: SURGERY

## 2023-11-21 PROCEDURE — 85025 COMPLETE CBC W/AUTO DIFF WBC: CPT | Performed by: SURGERY

## 2023-11-21 PROCEDURE — 86140 C-REACTIVE PROTEIN: CPT | Performed by: SURGERY

## 2023-11-21 PROCEDURE — 82550 ASSAY OF CK (CPK): CPT | Performed by: SURGERY

## 2023-11-27 ENCOUNTER — LAB REQUISITION (OUTPATIENT)
Dept: LAB | Facility: HOSPITAL | Age: 58
End: 2023-11-27
Payer: MEDICARE

## 2023-11-27 DIAGNOSIS — E66.9 OBESITY (BMI 30.0-34.9): ICD-10-CM

## 2023-11-27 DIAGNOSIS — E66.01 MORBID (SEVERE) OBESITY DUE TO EXCESS CALORIES: ICD-10-CM

## 2023-11-27 LAB
ALBUMIN SERPL-MCNC: 3.5 G/DL (ref 3.5–5)
ALBUMIN/GLOB SERPL: 1.1 RATIO (ref 1.1–2)
ALP SERPL-CCNC: 108 UNIT/L (ref 40–150)
ALT SERPL-CCNC: 32 UNIT/L (ref 0–55)
AST SERPL-CCNC: 34 UNIT/L (ref 5–34)
BASOPHILS # BLD AUTO: 0.06 X10(3)/MCL
BASOPHILS NFR BLD AUTO: 1.2 %
BILIRUB SERPL-MCNC: 0.5 MG/DL
BUN SERPL-MCNC: 16.4 MG/DL (ref 8.4–25.7)
CALCIUM SERPL-MCNC: 8.1 MG/DL (ref 8.4–10.2)
CHLORIDE SERPL-SCNC: 102 MMOL/L (ref 98–107)
CO2 SERPL-SCNC: 31 MMOL/L (ref 22–29)
CREAT SERPL-MCNC: 0.91 MG/DL (ref 0.73–1.18)
CRP SERPL-MCNC: 9.1 MG/L
EOSINOPHIL # BLD AUTO: 0.24 X10(3)/MCL (ref 0–0.9)
EOSINOPHIL NFR BLD AUTO: 4.7 %
ERYTHROCYTE [DISTWIDTH] IN BLOOD BY AUTOMATED COUNT: 14.6 % (ref 11.5–17)
ERYTHROCYTE [SEDIMENTATION RATE] IN BLOOD: 27 MM/HR (ref 0–15)
GFR SERPLBLD CREATININE-BSD FMLA CKD-EPI: >60 MLS/MIN/1.73/M2
GLOBULIN SER-MCNC: 3.1 GM/DL (ref 2.4–3.5)
GLUCOSE SERPL-MCNC: 87 MG/DL (ref 74–100)
HCT VFR BLD AUTO: 32 % (ref 42–52)
HGB BLD-MCNC: 10.4 G/DL (ref 14–18)
IMM GRANULOCYTES # BLD AUTO: 0.01 X10(3)/MCL (ref 0–0.04)
IMM GRANULOCYTES NFR BLD AUTO: 0.2 %
LYMPHOCYTES # BLD AUTO: 1.18 X10(3)/MCL (ref 0.6–4.6)
LYMPHOCYTES NFR BLD AUTO: 23 %
MCH RBC QN AUTO: 31.2 PG (ref 27–31)
MCHC RBC AUTO-ENTMCNC: 32.5 G/DL (ref 33–36)
MCV RBC AUTO: 96.1 FL (ref 80–94)
MONOCYTES # BLD AUTO: 0.45 X10(3)/MCL (ref 0.1–1.3)
MONOCYTES NFR BLD AUTO: 8.8 %
NEUTROPHILS # BLD AUTO: 3.18 X10(3)/MCL (ref 2.1–9.2)
NEUTROPHILS NFR BLD AUTO: 62.1 %
NRBC BLD AUTO-RTO: 0 %
PLATELET # BLD AUTO: 199 X10(3)/MCL (ref 130–400)
PMV BLD AUTO: 9.1 FL (ref 7.4–10.4)
POTASSIUM SERPL-SCNC: 3.8 MMOL/L (ref 3.5–5.1)
PROT SERPL-MCNC: 6.6 GM/DL (ref 6.4–8.3)
RBC # BLD AUTO: 3.33 X10(6)/MCL (ref 4.7–6.1)
SODIUM SERPL-SCNC: 138 MMOL/L (ref 136–145)
WBC # SPEC AUTO: 5.12 X10(3)/MCL (ref 4.5–11.5)

## 2023-11-27 PROCEDURE — 85652 RBC SED RATE AUTOMATED: CPT | Performed by: SURGERY

## 2023-11-27 PROCEDURE — 86140 C-REACTIVE PROTEIN: CPT | Performed by: SURGERY

## 2023-11-27 PROCEDURE — 85025 COMPLETE CBC W/AUTO DIFF WBC: CPT | Performed by: SURGERY

## 2023-11-27 PROCEDURE — 80053 COMPREHEN METABOLIC PANEL: CPT | Performed by: SURGERY

## 2023-11-27 RX ORDER — PHENTERMINE HYDROCHLORIDE 37.5 MG/1
TABLET ORAL
Qty: 60 TABLET | Refills: 4 | Status: SHIPPED | OUTPATIENT
Start: 2023-11-27 | End: 2024-03-19

## 2023-11-29 DIAGNOSIS — G47.19 EXCESSIVE DAYTIME SLEEPINESS: ICD-10-CM

## 2023-11-29 DIAGNOSIS — G47.33 OSA (OBSTRUCTIVE SLEEP APNEA): ICD-10-CM

## 2023-11-29 RX ORDER — MODAFINIL 200 MG/1
200 TABLET ORAL 2 TIMES DAILY
Qty: 60 TABLET | Refills: 0 | Status: SHIPPED | OUTPATIENT
Start: 2023-11-29 | End: 2023-12-27

## 2023-12-04 ENCOUNTER — LAB REQUISITION (OUTPATIENT)
Dept: LAB | Facility: HOSPITAL | Age: 58
End: 2023-12-04
Payer: MEDICARE

## 2023-12-04 DIAGNOSIS — E66.01 MORBID (SEVERE) OBESITY DUE TO EXCESS CALORIES: ICD-10-CM

## 2023-12-04 LAB
ALBUMIN SERPL-MCNC: 3.6 G/DL (ref 3.5–5)
ALBUMIN/GLOB SERPL: 1 RATIO (ref 1.1–2)
ALP SERPL-CCNC: 120 UNIT/L (ref 40–150)
ALT SERPL-CCNC: 29 UNIT/L (ref 0–55)
AST SERPL-CCNC: 24 UNIT/L (ref 5–34)
BASOPHILS # BLD AUTO: 0.04 X10(3)/MCL
BASOPHILS NFR BLD AUTO: 0.7 %
BILIRUB SERPL-MCNC: 0.5 MG/DL
BUN SERPL-MCNC: 15.5 MG/DL (ref 8.4–25.7)
CALCIUM SERPL-MCNC: 8.3 MG/DL (ref 8.4–10.2)
CHLORIDE SERPL-SCNC: 105 MMOL/L (ref 98–107)
CO2 SERPL-SCNC: 24 MMOL/L (ref 22–29)
CREAT SERPL-MCNC: 0.83 MG/DL (ref 0.73–1.18)
CRP SERPL-MCNC: 17.5 MG/L
EOSINOPHIL # BLD AUTO: 0.2 X10(3)/MCL (ref 0–0.9)
EOSINOPHIL NFR BLD AUTO: 3.7 %
ERYTHROCYTE [DISTWIDTH] IN BLOOD BY AUTOMATED COUNT: 14.2 % (ref 11.5–17)
ERYTHROCYTE [SEDIMENTATION RATE] IN BLOOD: 35 MM/HR (ref 0–15)
GFR SERPLBLD CREATININE-BSD FMLA CKD-EPI: >60 MLS/MIN/1.73/M2
GLOBULIN SER-MCNC: 3.5 GM/DL (ref 2.4–3.5)
GLUCOSE SERPL-MCNC: 121 MG/DL (ref 74–100)
HCT VFR BLD AUTO: 33.7 % (ref 42–52)
HGB BLD-MCNC: 10.8 G/DL (ref 14–18)
IMM GRANULOCYTES # BLD AUTO: 0.02 X10(3)/MCL (ref 0–0.04)
IMM GRANULOCYTES NFR BLD AUTO: 0.4 %
LYMPHOCYTES # BLD AUTO: 1.21 X10(3)/MCL (ref 0.6–4.6)
LYMPHOCYTES NFR BLD AUTO: 22.4 %
MCH RBC QN AUTO: 30.6 PG (ref 27–31)
MCHC RBC AUTO-ENTMCNC: 32 G/DL (ref 33–36)
MCV RBC AUTO: 95.5 FL (ref 80–94)
MONOCYTES # BLD AUTO: 0.42 X10(3)/MCL (ref 0.1–1.3)
MONOCYTES NFR BLD AUTO: 7.8 %
NEUTROPHILS # BLD AUTO: 3.52 X10(3)/MCL (ref 2.1–9.2)
NEUTROPHILS NFR BLD AUTO: 65 %
NRBC BLD AUTO-RTO: 0 %
PLATELET # BLD AUTO: 189 X10(3)/MCL (ref 130–400)
PMV BLD AUTO: 9.3 FL (ref 7.4–10.4)
POTASSIUM SERPL-SCNC: 3.7 MMOL/L (ref 3.5–5.1)
PROT SERPL-MCNC: 7.1 GM/DL (ref 6.4–8.3)
RBC # BLD AUTO: 3.53 X10(6)/MCL (ref 4.7–6.1)
SODIUM SERPL-SCNC: 137 MMOL/L (ref 136–145)
WBC # SPEC AUTO: 5.41 X10(3)/MCL (ref 4.5–11.5)

## 2023-12-04 PROCEDURE — 80053 COMPREHEN METABOLIC PANEL: CPT | Performed by: SURGERY

## 2023-12-04 PROCEDURE — 86140 C-REACTIVE PROTEIN: CPT | Performed by: SURGERY

## 2023-12-04 PROCEDURE — 85025 COMPLETE CBC W/AUTO DIFF WBC: CPT | Performed by: SURGERY

## 2023-12-04 PROCEDURE — 85652 RBC SED RATE AUTOMATED: CPT | Performed by: SURGERY

## 2023-12-11 ENCOUNTER — LAB REQUISITION (OUTPATIENT)
Dept: LAB | Facility: HOSPITAL | Age: 58
End: 2023-12-11
Payer: MEDICARE

## 2023-12-11 DIAGNOSIS — T81.42XA INFECTION FOLLOWING A PROCEDURE, DEEP INCISIONAL SURGICAL SITE, INITIAL ENCOUNTER: ICD-10-CM

## 2023-12-11 DIAGNOSIS — D64.9 ANEMIA, UNSPECIFIED: ICD-10-CM

## 2023-12-11 LAB
ALBUMIN SERPL-MCNC: 3.5 G/DL (ref 3.5–5)
ALBUMIN/GLOB SERPL: 1.1 RATIO (ref 1.1–2)
ALP SERPL-CCNC: 110 UNIT/L (ref 40–150)
ALT SERPL-CCNC: 17 UNIT/L (ref 0–55)
AST SERPL-CCNC: 17 UNIT/L (ref 5–34)
BASOPHILS # BLD AUTO: 0.03 X10(3)/MCL
BASOPHILS NFR BLD AUTO: 0.8 %
BILIRUB SERPL-MCNC: 0.3 MG/DL
BUN SERPL-MCNC: 14.3 MG/DL (ref 8.4–25.7)
CALCIUM SERPL-MCNC: 8.2 MG/DL (ref 8.4–10.2)
CHLORIDE SERPL-SCNC: 93 MMOL/L (ref 98–107)
CO2 SERPL-SCNC: 27 MMOL/L (ref 22–29)
CREAT SERPL-MCNC: 0.86 MG/DL (ref 0.73–1.18)
CRP SERPL-MCNC: 8.9 MG/L
EOSINOPHIL # BLD AUTO: 0.22 X10(3)/MCL (ref 0–0.9)
EOSINOPHIL NFR BLD AUTO: 5.7 %
ERYTHROCYTE [DISTWIDTH] IN BLOOD BY AUTOMATED COUNT: 13.7 % (ref 11.5–17)
ERYTHROCYTE [SEDIMENTATION RATE] IN BLOOD: 22 MM/HR (ref 0–15)
GFR SERPLBLD CREATININE-BSD FMLA CKD-EPI: >60 MLS/MIN/1.73/M2
GLOBULIN SER-MCNC: 3.3 GM/DL (ref 2.4–3.5)
GLUCOSE SERPL-MCNC: 77 MG/DL (ref 74–100)
HCT VFR BLD AUTO: 32.8 % (ref 42–52)
HGB BLD-MCNC: 10.8 G/DL (ref 14–18)
IMM GRANULOCYTES # BLD AUTO: 0.01 X10(3)/MCL (ref 0–0.04)
IMM GRANULOCYTES NFR BLD AUTO: 0.3 %
LYMPHOCYTES # BLD AUTO: 1.13 X10(3)/MCL (ref 0.6–4.6)
LYMPHOCYTES NFR BLD AUTO: 29.4 %
MCH RBC QN AUTO: 30.4 PG (ref 27–31)
MCHC RBC AUTO-ENTMCNC: 32.9 G/DL (ref 33–36)
MCV RBC AUTO: 92.4 FL (ref 80–94)
MONOCYTES # BLD AUTO: 0.44 X10(3)/MCL (ref 0.1–1.3)
MONOCYTES NFR BLD AUTO: 11.5 %
NEUTROPHILS # BLD AUTO: 2.01 X10(3)/MCL (ref 2.1–9.2)
NEUTROPHILS NFR BLD AUTO: 52.3 %
NRBC BLD AUTO-RTO: 0 %
PLATELET # BLD AUTO: 180 X10(3)/MCL (ref 130–400)
PMV BLD AUTO: 9.1 FL (ref 7.4–10.4)
POTASSIUM SERPL-SCNC: 3.3 MMOL/L (ref 3.5–5.1)
PROT SERPL-MCNC: 6.8 GM/DL (ref 6.4–8.3)
RBC # BLD AUTO: 3.55 X10(6)/MCL (ref 4.7–6.1)
SODIUM SERPL-SCNC: 124 MMOL/L (ref 136–145)
WBC # SPEC AUTO: 3.84 X10(3)/MCL (ref 4.5–11.5)

## 2023-12-11 PROCEDURE — 86140 C-REACTIVE PROTEIN: CPT | Performed by: SURGERY

## 2023-12-11 PROCEDURE — 80053 COMPREHEN METABOLIC PANEL: CPT | Performed by: SURGERY

## 2023-12-11 PROCEDURE — 85025 COMPLETE CBC W/AUTO DIFF WBC: CPT | Performed by: SURGERY

## 2023-12-11 PROCEDURE — 85652 RBC SED RATE AUTOMATED: CPT | Performed by: SURGERY

## 2023-12-18 ENCOUNTER — LAB REQUISITION (OUTPATIENT)
Dept: LAB | Facility: HOSPITAL | Age: 58
End: 2023-12-18
Payer: MEDICARE

## 2023-12-18 DIAGNOSIS — E11.649 TYPE 2 DIABETES MELLITUS WITH HYPOGLYCEMIA WITHOUT COMA: ICD-10-CM

## 2023-12-18 DIAGNOSIS — B96.5 PSEUDOMONAS (AERUGINOSA) (MALLEI) (PSEUDOMALLEI) AS THE CAUSE OF DISEASES CLASSIFIED ELSEWHERE: ICD-10-CM

## 2023-12-18 DIAGNOSIS — T81.42XA INFECTION FOLLOWING A PROCEDURE, DEEP INCISIONAL SURGICAL SITE, INITIAL ENCOUNTER: ICD-10-CM

## 2023-12-18 DIAGNOSIS — G97.64: ICD-10-CM

## 2023-12-18 DIAGNOSIS — Z48.817 ENCOUNTER FOR SURGICAL AFTERCARE FOLLOWING SURGERY ON THE SKIN AND SUBCUTANEOUS TISSUE: ICD-10-CM

## 2023-12-18 LAB
ALBUMIN SERPL-MCNC: 3.6 G/DL (ref 3.5–5)
ALBUMIN/GLOB SERPL: 1.1 RATIO (ref 1.1–2)
ALP SERPL-CCNC: 152 UNIT/L (ref 40–150)
ALT SERPL-CCNC: 43 UNIT/L (ref 0–55)
AST SERPL-CCNC: 59 UNIT/L (ref 5–34)
BASOPHILS # BLD AUTO: 0.02 X10(3)/MCL
BASOPHILS NFR BLD AUTO: 0.4 %
BILIRUB SERPL-MCNC: 0.4 MG/DL
BUN SERPL-MCNC: 15.9 MG/DL (ref 8.4–25.7)
CALCIUM SERPL-MCNC: 8.2 MG/DL (ref 8.4–10.2)
CHLORIDE SERPL-SCNC: 108 MMOL/L (ref 98–107)
CK SERPL-CCNC: 47 U/L (ref 30–200)
CO2 SERPL-SCNC: 26 MMOL/L (ref 22–29)
CREAT SERPL-MCNC: 0.8 MG/DL (ref 0.73–1.18)
CRP SERPL-MCNC: <1 MG/L
EOSINOPHIL # BLD AUTO: 0.17 X10(3)/MCL (ref 0–0.9)
EOSINOPHIL NFR BLD AUTO: 3.4 %
ERYTHROCYTE [DISTWIDTH] IN BLOOD BY AUTOMATED COUNT: 14.4 % (ref 11.5–17)
ERYTHROCYTE [SEDIMENTATION RATE] IN BLOOD: 16 MM/HR (ref 0–15)
GFR SERPLBLD CREATININE-BSD FMLA CKD-EPI: >60 MLS/MIN/1.73/M2
GLOBULIN SER-MCNC: 3.3 GM/DL (ref 2.4–3.5)
GLUCOSE SERPL-MCNC: 81 MG/DL (ref 74–100)
HCT VFR BLD AUTO: 33.1 % (ref 42–52)
HGB BLD-MCNC: 10.8 G/DL (ref 14–18)
IMM GRANULOCYTES # BLD AUTO: 0.03 X10(3)/MCL (ref 0–0.04)
IMM GRANULOCYTES NFR BLD AUTO: 0.6 %
LYMPHOCYTES # BLD AUTO: 1.06 X10(3)/MCL (ref 0.6–4.6)
LYMPHOCYTES NFR BLD AUTO: 21.1 %
MCH RBC QN AUTO: 30.9 PG (ref 27–31)
MCHC RBC AUTO-ENTMCNC: 32.6 G/DL (ref 33–36)
MCV RBC AUTO: 94.6 FL (ref 80–94)
MONOCYTES # BLD AUTO: 0.28 X10(3)/MCL (ref 0.1–1.3)
MONOCYTES NFR BLD AUTO: 5.6 %
NEUTROPHILS # BLD AUTO: 3.47 X10(3)/MCL (ref 2.1–9.2)
NEUTROPHILS NFR BLD AUTO: 68.9 %
NRBC BLD AUTO-RTO: 0 %
PLATELET # BLD AUTO: 153 X10(3)/MCL (ref 130–400)
PLATELETS.RETICULATED NFR BLD AUTO: 1.7 % (ref 0.9–11.2)
PMV BLD AUTO: 9.2 FL (ref 7.4–10.4)
POTASSIUM SERPL-SCNC: 4.2 MMOL/L (ref 3.5–5.1)
PROT SERPL-MCNC: 6.9 GM/DL (ref 6.4–8.3)
RBC # BLD AUTO: 3.5 X10(6)/MCL (ref 4.7–6.1)
SODIUM SERPL-SCNC: 140 MMOL/L (ref 136–145)
WBC # SPEC AUTO: 5.03 X10(3)/MCL (ref 4.5–11.5)

## 2023-12-18 PROCEDURE — 85652 RBC SED RATE AUTOMATED: CPT | Performed by: SURGERY

## 2023-12-18 PROCEDURE — 86140 C-REACTIVE PROTEIN: CPT | Performed by: SURGERY

## 2023-12-18 PROCEDURE — 80053 COMPREHEN METABOLIC PANEL: CPT | Performed by: SURGERY

## 2023-12-18 PROCEDURE — 85025 COMPLETE CBC W/AUTO DIFF WBC: CPT | Performed by: SURGERY

## 2023-12-18 PROCEDURE — 82550 ASSAY OF CK (CPK): CPT | Performed by: SURGERY

## 2023-12-27 DIAGNOSIS — G47.33 OSA (OBSTRUCTIVE SLEEP APNEA): ICD-10-CM

## 2023-12-27 DIAGNOSIS — G47.19 EXCESSIVE DAYTIME SLEEPINESS: ICD-10-CM

## 2023-12-27 RX ORDER — MODAFINIL 200 MG/1
200 TABLET ORAL 2 TIMES DAILY
Qty: 60 TABLET | Refills: 0 | Status: SHIPPED | OUTPATIENT
Start: 2023-12-27 | End: 2024-01-25 | Stop reason: SDUPTHER

## 2024-01-24 DIAGNOSIS — G47.33 OSA (OBSTRUCTIVE SLEEP APNEA): ICD-10-CM

## 2024-01-24 DIAGNOSIS — G47.19 EXCESSIVE DAYTIME SLEEPINESS: ICD-10-CM

## 2024-01-24 RX ORDER — MODAFINIL 200 MG/1
200 TABLET ORAL 2 TIMES DAILY
Qty: 60 TABLET | Refills: 0 | OUTPATIENT
Start: 2024-01-24

## 2024-01-25 RX ORDER — MODAFINIL 200 MG/1
200 TABLET ORAL 2 TIMES DAILY
Qty: 60 TABLET | Refills: 0 | Status: SHIPPED | OUTPATIENT
Start: 2024-01-25 | End: 2024-02-19

## 2024-02-19 DIAGNOSIS — G47.19 EXCESSIVE DAYTIME SLEEPINESS: ICD-10-CM

## 2024-02-19 DIAGNOSIS — G47.33 OSA (OBSTRUCTIVE SLEEP APNEA): ICD-10-CM

## 2024-02-19 RX ORDER — MODAFINIL 200 MG/1
200 TABLET ORAL 2 TIMES DAILY
Qty: 60 TABLET | Refills: 3 | Status: SHIPPED | OUTPATIENT
Start: 2024-02-19 | End: 2024-06-11

## 2024-03-04 ENCOUNTER — PATIENT MESSAGE (OUTPATIENT)
Dept: NEUROLOGY | Facility: CLINIC | Age: 59
End: 2024-03-04
Payer: MEDICARE

## 2024-03-19 DIAGNOSIS — E66.9 OBESITY (BMI 30.0-34.9): ICD-10-CM

## 2024-03-19 RX ORDER — PHENTERMINE HYDROCHLORIDE 37.5 MG/1
TABLET ORAL
Qty: 60 TABLET | Refills: 4 | Status: SHIPPED | OUTPATIENT
Start: 2024-03-19

## 2024-06-11 DIAGNOSIS — G47.33 OSA (OBSTRUCTIVE SLEEP APNEA): ICD-10-CM

## 2024-06-11 DIAGNOSIS — G47.19 EXCESSIVE DAYTIME SLEEPINESS: ICD-10-CM

## 2024-06-11 RX ORDER — MODAFINIL 200 MG/1
200 TABLET ORAL 2 TIMES DAILY
Qty: 60 TABLET | Refills: 3 | Status: SHIPPED | OUTPATIENT
Start: 2024-06-11

## 2024-08-06 DIAGNOSIS — E66.9 OBESITY (BMI 30.0-34.9): ICD-10-CM

## 2024-08-06 RX ORDER — PHENTERMINE HYDROCHLORIDE 37.5 MG/1
TABLET ORAL
Qty: 60 TABLET | Refills: 4 | Status: SHIPPED | OUTPATIENT
Start: 2024-08-06

## 2024-08-29 ENCOUNTER — PATIENT MESSAGE (OUTPATIENT)
Dept: NEUROLOGY | Facility: CLINIC | Age: 59
End: 2024-08-29
Payer: MEDICARE

## 2024-09-03 ENCOUNTER — OFFICE VISIT (OUTPATIENT)
Dept: NEUROLOGY | Facility: CLINIC | Age: 59
End: 2024-09-03
Payer: MEDICARE

## 2024-09-03 VITALS
BODY MASS INDEX: 32.9 KG/M2 | WEIGHT: 235 LBS | SYSTOLIC BLOOD PRESSURE: 126 MMHG | HEIGHT: 71 IN | DIASTOLIC BLOOD PRESSURE: 86 MMHG

## 2024-09-03 DIAGNOSIS — G47.19 EXCESSIVE DAYTIME SLEEPINESS: ICD-10-CM

## 2024-09-03 DIAGNOSIS — E66.2 OBESITY HYPOVENTILATION SYNDROME: Primary | ICD-10-CM

## 2024-09-03 DIAGNOSIS — Z98.84 H/O GASTRIC BYPASS: ICD-10-CM

## 2024-09-03 DIAGNOSIS — G47.33 OSA (OBSTRUCTIVE SLEEP APNEA): ICD-10-CM

## 2024-09-03 DIAGNOSIS — G70.9 NEUROMUSCULAR WEAKNESS: ICD-10-CM

## 2024-09-03 PROCEDURE — 99999 PR PBB SHADOW E&M-EST. PATIENT-LVL III: CPT | Mod: PBBFAC,,, | Performed by: NURSE PRACTITIONER

## 2024-09-03 PROCEDURE — 99214 OFFICE O/P EST MOD 30 MIN: CPT | Mod: S$PBB,,, | Performed by: NURSE PRACTITIONER

## 2024-09-03 PROCEDURE — 99213 OFFICE O/P EST LOW 20 MIN: CPT | Mod: PBBFAC | Performed by: NURSE PRACTITIONER

## 2024-09-03 RX ORDER — FUROSEMIDE 20 MG/1
20 TABLET ORAL EVERY MORNING
COMMUNITY

## 2024-09-03 NOTE — PROGRESS NOTES
Neurology Note - Sleep follow up    Subjective:         Patient ID: Markie Ashby is a 59 y.o. male.    Chief Complaint: 1 yr ben f/u; on trilogy    HPI:            Pt reports nightly usage of pap machine; pap therapy beneficial for sleep. Refreshed awakenings, denies EDS. Tolerating mask well but feels the pressure is too high since losing 225 pounds    Needs supply order; DME is [employer].    PAP data:   Date range: 1/1/24-8/1/24  % of usage >= 4 hrs: 47%    Vevo  Insp pressure 26 cm  PEEP 5.0 cm, Rise time 4, Insp trigger 3  exp trigger 3  back up rate 12 bpm  backup insp time 1.0 s, target volume 500 mL, max pressure 35.0 cm min pressure 7.0 cm autoEPAP off    Provigil and Phentermine remains efficacious         9/3/2024     2:08 PM   EPWORTH SLEEPINESS SCALE   Sitting and reading 3   Watching TV 2   Sitting, inactive in a public place (e.g. a theatre or a meeting) 2   As a passenger in a car for an hour without a break 3   Lying down to rest in the afternoon when circumstances permit 3   Sitting and talking to someone 1   Sitting quietly after a lunch without alcohol 2   In a car, while stopped for a few minutes in traffic 1   Total score 17         ROS: as per HPI, otherwise pertinent systems review is negative             Past Medical History:   Diagnosis Date    Diabetes mellitus     BEN (obstructive sleep apnea)        Past Surgical History:   Procedure Laterality Date    APPENDECTOMY      CHOLECYSTECTOMY      KYPHOSIS SURGERY      LAMINECTOMY      VASECTOMY         Family History   Problem Relation Name Age of Onset    Diabetes Mother      Cancer Brother         Social History     Socioeconomic History    Marital status:    Tobacco Use    Smoking status: Never    Smokeless tobacco: Never   Substance and Sexual Activity    Alcohol use: Never    Drug use: Never   Social History Narrative    ** Merged History Encounter **          Social Determinants of Health     Food Insecurity: No Food Insecurity  "(2023)    Received from [employer] [facility] Its Subsidiaries and Affiliates    Hunger Vital Sign     Worried About Running Out of Food in the Last Year: Never true     Ran Out of Food in the Last Year: Never true   Transportation Needs: No Transportation Needs (2023)    Received from [employer] [facility] Subsidiaries and Affiliates    PRAPARE - Transportation     Lack of Transportation (Medical): No     Lack of Transportation (Non-Medical): No       Review of patient's allergies indicates:   Allergen Reactions    Oxycodone      Other reaction(s): haullincante    Penicillins Rash       Current Outpatient Medications   Medication Instructions    biotin 1 mg Cap Oral    calcium citrate 250 mg, Oral    cyanocobalamin 1,000 mcg/mL injection SMARTSI MCG IM    ergocalciferol (ERGOCALCIFEROL) 50,000 Units, Oral, Every 7 days    furosemide (LASIX) 20 mg, Oral, Every morning    gabapentin (NEURONTIN) 600 mg, Oral, 3 times daily    modafiniL (PROVIGIL) 200 mg, Oral, 2 times daily    multivitamin with minerals tablet 1 tablet, Oral    phentermine (ADIPEX-P) 37.5 mg tablet TAKE 1 TABLET BY MOUTH TWICE DAILY    vilazodone (VIIBRYD) 40 mg, Oral, Daily    vitamin A 10,000 Units, Oral, Daily         Objective:      Exam:   /86 (BP Location: Left arm, Patient Position: Sitting)   Ht 5' 11" (1.803 m)   Wt 106.6 kg (235 lb)   BMI 32.78 kg/m²     Physical Exam  Vitals reviewed.   Constitutional:       Appearance: Normal appearance.      Accompanied by: alone  HENT:      Ears:      Comments: Hearing normal.  Eyes:      Extraocular Movements: Extraocular movements intact.   Cardiovascular:      Rate and Rhythm: Normal rate and regular rhythm.   Pulmonary:      Effort: Pulmonary effort is normal.      Breath sounds: Normal breath sounds.   Musculoskeletal:         General: Normal range of motion.   Skin:     General: Skin is warm and dry. "   Neurological:      General: No focal deficit present.      Mental Status: He is alert and oriented to person, place, and time.      Gait standing rollator  Psychiatric:         Mood and Affect: Mood normal.         Behavior: Behavior normal.         Assessment/Plan:     Problem List Items Addressed This Visit       Neuromuscular weakness    Obesity hypoventilation syndrome - Primary  H/o gastric bypass   Excessive day time sleepiness    Lost 225 pounds; feels pressure on Vevo is too high. I am asking for PAP titration night to determine pressure setting to adequately manage his OH[facility]    Continue the Provigil  Phentermine rx from Dr. Roberts    Follow up TBD - contingent upon completion to PAP night            Juventino Kelley, MSN, APRN, AGACNP-BC

## 2024-09-04 ENCOUNTER — PROCEDURE VISIT (OUTPATIENT)
Dept: SLEEP MEDICINE | Facility: HOSPITAL | Age: 59
End: 2024-09-04
Attending: NURSE PRACTITIONER
Payer: MEDICARE

## 2024-09-04 DIAGNOSIS — G70.9 NEUROMUSCULAR WEAKNESS: ICD-10-CM

## 2024-09-04 DIAGNOSIS — E66.2 OBESITY HYPOVENTILATION SYNDROME: ICD-10-CM

## 2024-09-04 DIAGNOSIS — G47.33 OSA (OBSTRUCTIVE SLEEP APNEA): ICD-10-CM

## 2024-09-04 DIAGNOSIS — G47.19 EXCESSIVE DAYTIME SLEEPINESS: ICD-10-CM

## 2024-09-04 DIAGNOSIS — Z98.84 H/O GASTRIC BYPASS: ICD-10-CM

## 2024-09-04 PROCEDURE — 95811 POLYSOM 6/>YRS CPAP 4/> PARM: CPT

## 2024-10-09 ENCOUNTER — OFFICE VISIT (OUTPATIENT)
Dept: NEUROLOGY | Facility: CLINIC | Age: 59
End: 2024-10-09
Payer: MEDICARE

## 2024-10-09 DIAGNOSIS — G47.33 OSA (OBSTRUCTIVE SLEEP APNEA): Primary | ICD-10-CM

## 2024-10-09 DIAGNOSIS — G47.19 EXCESSIVE DAYTIME SLEEPINESS: ICD-10-CM

## 2024-10-09 PROCEDURE — 99213 OFFICE O/P EST LOW 20 MIN: CPT | Mod: 95,,, | Performed by: NURSE PRACTITIONER

## 2024-10-09 NOTE — TELEPHONE ENCOUNTER
----- Message from SAMMY Otto sent at 10/9/2024  2:14 PM CDT -----  Patient requesting Modafinil refill

## 2024-10-09 NOTE — PROGRESS NOTES
Subjective  Patient ID:      Chief Complaint: sleep study follow up     HPI:           The patient location is: home  Visit type: audiovisual     Patient presents for Pap titration night. Was on Trilogy. Had a positive weight loss of 225 lbs. Reports he is now on VEVO machine through [employer].    In lab titration night showed patient tolerating pap pressures at minimal to medium pressures throughout the night. Recommendation for patient to utilize autopap.             2024    10:50 AM 9/3/2024     2:08 PM 2023     8:18 PM 2023     8:55 AM 2023     8:51 AM 10/17/2022    10:44 AM   EPWORTH SLEEPINESS SCALE TOTAL SCORE    Total score 20 17 16 21 11 15               This is a telemedicine note. After obtaining verbal consent/patient identification using name and , patient was treated using real time audio/video, according to [facility] protocols. Quynh BASURTO NP [distant provider], conducted the visit from location identified below. The patient participated in the visit at a non-[facility] location selected by the patient (or patients representative), identified below. I am licensed in the state where the patient stated they are located. The patient (or patients representative) stated that they understood and accepted the privacy and security risks to their information at their location.    Distant provider was located at [facility]    Each patient to whom he or she provides medical services by telemedicine is:  (1) informed of the relationship between the physician and patient and the respective role of any other health care provider with respect to management of the patient; and (2) notified that he or she may decline to receive medical services by telemedicine and may withdraw from such care at any time           Past Medical History:   Diagnosis Date    Diabetes mellitus     JOLENE (obstructive sleep apnea)        Past Surgical History:   Procedure Laterality Date    APPENDECTOMY       CHOLECYSTECTOMY      KYPHOSIS SURGERY      LAMINECTOMY      VASECTOMY         Family History   Problem Relation Name Age of Onset    Diabetes Mother      Cancer Brother         Social History     Socioeconomic History    Marital status:    Tobacco Use    Smoking status: Never    Smokeless tobacco: Never   Substance and Sexual Activity    Alcohol use: Never    Drug use: Never   Social History Narrative    ** Merged History Encounter **          Social Drivers of Health     Food Insecurity: No Food Insecurity (2023)    Received from [facility] Subsidi[facility] and Affiliates    Hunger Vital Sign     Worried About Running Out of Food in the Last Year: Never true     Ran Out of Food in the Last Year: Never true   Transportation Needs: No Transportation Needs (2023)    [facility]    PRAPARE - Transportation     Lack of Transportation (Medical): No     Lack of Transportation (Non-Medical): No       Current Outpatient Medications   Medication Sig Dispense Refill    biotin 1 mg Cap Take by mouth.      calcium citrate 250 mg calcium Tab Take 250 mg by mouth.      cyanocobalamin 1,000 mcg/mL injection SMARTSI MCG IM      ergocalciferol (ERGOCALCIFEROL) 50,000 unit Cap Take 50,000 Units by mouth every 7 days.      furosemide (LASIX) 20 MG tablet Take 20 mg by mouth every morning.      gabapentin (NEURONTIN) 600 MG tablet Take 600 mg by mouth 3 (three) times daily.      modafiniL (PROVIGIL) 200 MG Tab TAKE 1 TABLET (200 MG TOTAL) BY MOUTH 2 (TWO) TIMES DAILY. 60 tablet 3    multivitamin with minerals tablet Take 1 tablet by mouth.      phentermine (ADIPEX-P) 37.5 mg tablet TAKE 1 TABLET BY MOUTH TWICE DAILY 60 tablet 4    vilazodone (VIIBRYD) 40 mg Tab tablet Take 40 mg by mouth once daily.      vitamin A 85554 UNIT capsule Take 10,000 Units by mouth once daily.       No  current facility-administered medications for this visit.       Review of patient's allergies indicates:   Allergen Reactions    Oxycodone      Other reaction(s): haullincante    Penicillins Rash             Objective:      Objective  Physical Exam:  General- Patient alert and oriented x3 in NAD  Speech - nml  HEENT- EOMI  Resp- No increased WOB noted. Not using accessory muscles.  Skin-  No Jaundice. No visible skin lesions.           Assessment/Plan:      Problem List Items Addressed This Visit     JOLENE      [facility] pressure range of 5-11 cm H2O  Will follow up in 2-3 months to evaluate patient tolerance of Autopap

## 2024-10-10 RX ORDER — MODAFINIL 200 MG/1
200 TABLET ORAL 2 TIMES DAILY
Qty: 60 TABLET | Refills: 3 | Status: SHIPPED | OUTPATIENT
Start: 2024-10-10

## 2025-01-07 DIAGNOSIS — E66.811 OBESITY (BMI 30.0-34.9): ICD-10-CM

## 2025-01-07 DIAGNOSIS — G47.19 EXCESSIVE DAYTIME SLEEPINESS: ICD-10-CM

## 2025-01-07 DIAGNOSIS — G47.33 OSA (OBSTRUCTIVE SLEEP APNEA): ICD-10-CM

## 2025-01-07 RX ORDER — PHENTERMINE HYDROCHLORIDE 37.5 MG/1
TABLET ORAL
Qty: 60 TABLET | Refills: 4 | Status: SHIPPED | OUTPATIENT
Start: 2025-01-07

## 2025-01-07 RX ORDER — MODAFINIL 200 MG/1
200 TABLET ORAL 2 TIMES DAILY
Qty: 60 TABLET | Refills: 3 | Status: SHIPPED | OUTPATIENT
Start: 2025-01-07

## 2025-01-09 ENCOUNTER — OFFICE VISIT (OUTPATIENT)
Dept: NEUROLOGY | Facility: CLINIC | Age: 60
End: 2025-01-09
Payer: MEDICARE

## 2025-01-09 VITALS
HEIGHT: 71 IN | BODY MASS INDEX: 31.36 KG/M2 | SYSTOLIC BLOOD PRESSURE: 136 MMHG | DIASTOLIC BLOOD PRESSURE: 80 MMHG | WEIGHT: 224 LBS

## 2025-01-09 DIAGNOSIS — G47.33 OSA (OBSTRUCTIVE SLEEP APNEA): Primary | ICD-10-CM

## 2025-01-09 DIAGNOSIS — G47.19 EXCESSIVE DAYTIME SLEEPINESS: ICD-10-CM

## 2025-01-09 PROBLEM — E66.2 OBESITY HYPOVENTILATION SYNDROME: Status: RESOLVED | Noted: 2024-09-03 | Resolved: 2025-01-09

## 2025-01-09 PROCEDURE — 99213 OFFICE O/P EST LOW 20 MIN: CPT | Mod: PBBFAC | Performed by: NURSE PRACTITIONER

## 2025-01-09 PROCEDURE — 99214 OFFICE O/P EST MOD 30 MIN: CPT | Mod: S$PBB,,, | Performed by: NURSE PRACTITIONER

## 2025-01-09 PROCEDURE — 99999 PR PBB SHADOW E&M-EST. PATIENT-LVL III: CPT | Mod: PBBFAC,,, | Performed by: NURSE PRACTITIONER

## 2025-01-09 RX ORDER — PANCRELIPASE 36000; 180000; 114000 [USP'U]/1; [USP'U]/1; [USP'U]/1
CAPSULE, DELAYED RELEASE PELLETS ORAL
COMMUNITY

## 2025-01-09 NOTE — PROGRESS NOTES
"Neurology  Established Patient   SUBJECTIVE:    Patient ID: Markie Ashby , 59 y.o.    Past Medical History:   Diagnosis Date    Diabetes mellitus     JOLENE (obstructive sleep apnea)        Past Surgical History:   Procedure Laterality Date    APPENDECTOMY      CHOLECYSTECTOMY      KYPHOSIS SURGERY      LAMINECTOMY      VASECTOMY         Review of patient's allergies indicates:   Allergen Reactions    Oxycodone      Other reaction(s): haullincante    Penicillins Rash       Chief Complaint:  JOLENE f/u    History of Present Illness:     LOV 10/2024- we ordered Autopap with pressure range of 5-11 cm H2O    Has not received his new CPAP machine from [employer]. He last spoke to them 2-3 wks. ago.     I spoke with [employer]. They need a diagnostic study to  confirm he still has JOLENE    Takes Adipex 37.5 mg, 1 BID + Provigil 200mg, 1 BID    Review of Systems - as per HPI, otherwise a balanced 10 systems review is negative.    Current Medications:  Current Outpatient Medications   Medication Instructions    biotin 1 mg Cap Take by mouth.    calcium citrate 250 mg    cyanocobalamin 1,000 mcg/mL injection SMARTSI MCG IM    ergocalciferol (ERGOCALCIFEROL) 50,000 Units, Every 7 days    furosemide (LASIX) 20 mg, Every morning    gabapentin (NEURONTIN) 600 mg, 3 times daily    lipase-protease-amylase (CREON) 36,000-114,000- 180,000 unit CpDR TAKE 2 CAPSULES BY MOUTH IN THE MORNING, 2 CAPSULES AT NOON, 2 CAPSULES IN THE EVENING AND 1 WITH EACH SNACK    modafiniL (PROVIGIL) 200 mg, Oral, 2 times daily    multivitamin with minerals tablet 1 tablet    phentermine (ADIPEX-P) 37.5 mg tablet TAKE 1 TABLET BY MOUTH TWICE DAILY    vilazodone (VIIBRYD) 40 mg, Daily    vitamin A 10,000 Units, Daily       OBJECTIVE:    Vitals:  /80 (BP Location: Right arm, Patient Position: Sitting)   Ht 5' 11" (1.803 m)   Wt 101.6 kg (224 lb)   BMI 31.24 kg/m²      Physical Exam:  Constitutional  he appears well-developed and well-nourished. he is well " groomed.    Accompanied by - self  Appearance - well appearing, no apparent distress, unassisted  Skin- no obvious lesions noted    Neurologic  Cortical function - The patient is alert, attentive, and oriented  Speech - clear   Cranial nerves:  CN 3, 4, 6 EOMs - normal. No ptosis or lateral gaze deviation  CN 7 - no face asymmetry; normal eye closure and smile  CN 8 - hearing is grossly normal  Motor - grossly normal  Gait - unassisted; posture upright. gait is steady with normal steps      ASSESSMENT /PLAN:    Problem List Items Addressed This Visit          Other    JOLENE (obstructive sleep apnea) - Primary    Insurance requiring HST (without PAP).   Once done, will reorder autoPAP with range of 5-11cm        Excessive daytime sleepiness    Takes Adipex 37.5 mg, 1 BID + Provigil 200mg, 1 BID        Questions and concerns were sought and answered to the patient's stated verbal satisfaction.    The patient verbalizes understanding and agreement with the above stated treatment plan.   Items discussed include acute and/or chronic neurological, sleep, or other issues and their attendant differential diagnoses.  Potential for additional testing, treatment options, and prognosis also discussed.    ___single dx _*__multiple issues/ diagnoses  ___ low *__ mod ___ high complexity of data  ___low _*_mod ___ high risks     Medical Decision Making (MDM) used for CPT choice:  ___low  _*__moderate  ____high        ROBI Mancera  [facility]  [phone]

## 2025-01-13 ENCOUNTER — PROCEDURE VISIT (OUTPATIENT)
Dept: SLEEP MEDICINE | Facility: HOSPITAL | Age: 60
End: 2025-01-13
Attending: NURSE PRACTITIONER
Payer: MEDICARE

## 2025-01-13 DIAGNOSIS — G47.33 OSA (OBSTRUCTIVE SLEEP APNEA): ICD-10-CM

## 2025-01-13 DIAGNOSIS — Z98.84 H/O GASTRIC BYPASS: Primary | ICD-10-CM

## 2025-01-13 DIAGNOSIS — G47.19 EXCESSIVE DAYTIME SLEEPINESS: ICD-10-CM

## 2025-01-13 PROCEDURE — G0399 HOME SLEEP TEST/TYPE 3 PORTA: HCPCS

## 2025-01-13 PROCEDURE — 95806 SLEEP STUDY UNATT&RESP EFFT: CPT | Mod: 26,,, | Performed by: SPECIALIST

## 2025-02-03 ENCOUNTER — OFFICE VISIT (OUTPATIENT)
Dept: NEUROLOGY | Facility: CLINIC | Age: 60
End: 2025-02-03
Payer: MEDICARE

## 2025-02-03 DIAGNOSIS — Z98.84 H/O GASTRIC BYPASS: ICD-10-CM

## 2025-02-03 DIAGNOSIS — G47.19 EXCESSIVE DAYTIME SLEEPINESS: Primary | ICD-10-CM

## 2025-02-03 DIAGNOSIS — E66.811 OBESITY (BMI 30.0-34.9): ICD-10-CM

## 2025-02-03 DIAGNOSIS — R06.83 SNORING: ICD-10-CM

## 2025-02-03 PROCEDURE — 98004 SYNCH AUDIO-VIDEO EST SF 10: CPT | Mod: 95,,,

## 2025-02-03 NOTE — PROGRESS NOTES
Neurology Clinic - Virtual Visit      Patient ID: [id number]     Subjective:     Chief Complaint: Sleep Apnea (Review HST results)      HPI:    Markie Ashby is a 60 y.o. male here today for a telemedicine visit.     Pt here to review HST results; no changes since last visit.    Previously wore trilogy. Had a 225lb weight loss. He still has the trilogy but not currently using it because he feels the pressure is too high. This prompted an in-lab titration night this past September, after which autopap 5-11cm was ordered. His insurance asked that he repeat a HST without pap prior to approving/issuing the autopap.    C/o EDS, despite taking provigil 200mg, 1 tab BID and phentermine 37.5mg daily. Awakens un-refreshed and snores loudly, per wife.     HST results:   HST RAHEEL: 3.8  Minimum SaO2: 87 %  Time SaO2 value is less than 88%: 0.4min    He says he was diagnosed with JOLENE ~30 years ago and at the time of initial diagnosis he actually weighed less than he currently does, so he is concerned that he still may have JOLENE and would like to proceed with PSG.     This is a telemedicine note. Patient was treated using telemedicine, real time audio and video, according to [facility] protocols. Ginette BASURTO FNP-C, conducted the visit from the [facility]. The patient participated in the visit at a non-[facility] location selected by the patient (or patient's representative), identified below. I am licensed in the state where the patient stated they are located. The patient (or patient's representative) stated that they understood and accepted the privacy and security risks to their information at their location. This visit is not recorded.    The patient's location is: vehicle  Visit type: audiovisual     Past Medical History:   Diagnosis Date    Diabetes mellitus     JOLENE (obstructive sleep apnea)         Past Surgical History:   Procedure Laterality Date    APPENDECTOMY      CHOLECYSTECTOMY      KYPHOSIS SURGERY       LAMINECTOMY      VASECTOMY          Social History     Tobacco Use    Smoking status: Never    Smokeless tobacco: Never   Substance and Sexual Activity    Alcohol use: Never    Drug use: Never    Sexual activity: Not on file        Current Outpatient Medications   Medication Instructions    biotin 1 mg Cap Take by mouth.    calcium citrate 250 mg    cyanocobalamin 1,000 mcg/mL injection SMARTSI MCG IM    ergocalciferol (ERGOCALCIFEROL) 50,000 Units, Every 7 days    furosemide (LASIX) 20 mg, Every morning    gabapentin (NEURONTIN) 600 mg, 3 times daily    lipase-protease-amylase (CREON) 36,000-114,000- 180,000 unit CpDR TAKE 2 CAPSULES BY MOUTH IN THE MORNING, 2 CAPSULES AT NOON, 2 CAPSULES IN THE EVENING AND 1 WITH EACH SNACK    modafiniL (PROVIGIL) 200 mg, Oral, 2 times daily    multivitamin with minerals tablet 1 tablet    phentermine (ADIPEX-P) 37.5 mg tablet TAKE 1 TABLET BY MOUTH TWICE DAILY    vilazodone (VIIBRYD) 40 mg, Daily    vitamin A 10,000 Units, Daily       Review of patient's allergies indicates:   Allergen Reactions    Oxycodone      Other reaction(s): haullincante    Penicillins Rash        Review of Systems    12 point review of systems conducted, negative except as stated in the history of present illness. See HPI for details.    Objective:     There were no vitals taken for this visit.    Physical Exam      Physical Exam: LIMITED DUE TO TELEMEDICINE RESTRICTIONS.  General: Alert and oriented, No acute distress.  Speech: clear/ fluent  Head: Normocephalic.  Eyes: Sclera non-icteric. EOM intact.  Respiratory: Non-labored respirations, pulmonary effort normal  Musculoskeletal: range of motion grossly normal  Integumentary:  No visible suspicious lesions or rashes.  Neurologic: No focal deficits  Psychiatric: Normal interaction, Coherent speech, Euthymic mood, Appropriate affect     Assessment:       ICD-10-CM ICD-9-CM   1. Excessive daytime sleepiness  G47.19 780.54   2. Obesity (BMI  30.0-34.9)  E66.811 278.00   3. Snoring  R06.83 786.09   4. H/O gastric bypass  Z98.84 V45.86        Plan:     -Autopap 5-11cm was previously ordered based on in-lab pap titration night however insurance requested HST prior to issuing the autopap.   -Clinically meaningful sleep apnea was not recorded on repeat HST.  -He is concerned about these results because he apparently weighed less at the time of initial JOLENE diagnosis than he does now. As such, he would like to proceed with PSG.     Orders Placed This Encounter   Procedures    Polysomnography 4 or more parameters      Follow-up TBD contingent upon PSG. In addition to their scheduled follow up, the patient has also been instructed to follow up on as needed basis.     No future appointments.       Video Time Documentation:  Spent 10 minutes with patient face to face discussed health concerns. More than 50% of this time was spent in counseling and coordination of care.    Ginette Pedro, MSN, APRN, FNP-C  [facility]  [phone]

## 2025-02-15 ENCOUNTER — PROCEDURE VISIT (OUTPATIENT)
Dept: SLEEP MEDICINE | Facility: HOSPITAL | Age: 60
End: 2025-02-15
Payer: MEDICARE

## 2025-02-15 DIAGNOSIS — Z98.84 H/O GASTRIC BYPASS: ICD-10-CM

## 2025-02-15 DIAGNOSIS — R06.83 SNORING: ICD-10-CM

## 2025-02-15 DIAGNOSIS — G47.33 OSA (OBSTRUCTIVE SLEEP APNEA): Primary | ICD-10-CM

## 2025-02-15 DIAGNOSIS — E66.811 OBESITY (BMI 30.0-34.9): ICD-10-CM

## 2025-02-15 DIAGNOSIS — G47.19 EXCESSIVE DAYTIME SLEEPINESS: ICD-10-CM

## 2025-02-15 PROCEDURE — 95810 POLYSOM 6/> YRS 4/> PARAM: CPT

## 2025-02-24 ENCOUNTER — OFFICE VISIT (OUTPATIENT)
Facility: CLINIC | Age: 60
End: 2025-02-24
Payer: MEDICARE

## 2025-02-24 DIAGNOSIS — G47.33 OSA (OBSTRUCTIVE SLEEP APNEA): Primary | ICD-10-CM

## 2025-02-24 DIAGNOSIS — G47.19 EXCESSIVE DAYTIME SLEEPINESS: ICD-10-CM

## 2025-02-24 DIAGNOSIS — R06.83 SNORING: ICD-10-CM

## 2025-02-24 DIAGNOSIS — Z98.84 H/O GASTRIC BYPASS: ICD-10-CM

## 2025-02-24 PROCEDURE — 98004 SYNCH AUDIO-VIDEO EST SF 10: CPT | Mod: 95,,,

## 2025-02-24 NOTE — PROGRESS NOTES
Neurology Clinic - Virtual Visit      Patient ID: [id number]     Subjective:     Chief Complaint: Sleep Apnea (Review PSG results)      HPI:    Markie Ashby is a 60 y.o. male here today for a telemedicine visit.     Here to review PSG results. No changes since last visit. C/o EDS, despite taking provigil 200mg, 1 tab BID and phentermine 37.5mg daily. Awakens un-refreshed and snores loudly, per wife. Previously wore trilogy however after a 225lb weight loss the pressure is too strong, therefore not currently using PAP.     PSG results:  PSG AHI: 8  PSG AHI in REM: 16  Isaias: 83  Time SaO2 value is less than 88%: 5min         2/15/2025   EPWORTH SLEEPINESS SCALE   Sitting and reading 2   Watching TV 2   Sitting, inactive in a public place (e.g. a theatre or a meeting) 1   As a passenger in a car for an hour without a break 3   Lying down to rest in the afternoon when circumstances permit 3   Sitting and talking to someone 1   Sitting quietly after a lunch without alcohol 2   In a car, while stopped for a few minutes in traffic 1   Total score 15        Patient-reported     This is a telemedicine note. Patient was treated using telemedicine, real time audio and video, according to [facility] protocols. Ginette BASURTO FNP-C, conducted the visit from the [facility]. The patient participated in the visit at a non-[facility] location selected by the patient (or patient's representative), identified below. I am licensed in the state where the patient stated they are located. The patient (or patient's representative) stated that they understood and accepted the privacy and security risks to their information at their location. This visit is not recorded.    The patient's location is: home  Visit type: audiovisual     Past Medical History:   Diagnosis Date    Diabetes mellitus     JOLENE (obstructive sleep apnea)         Past Surgical History:   Procedure Laterality Date    APPENDECTOMY      CHOLECYSTECTOMY       KYPHOSIS SURGERY      LAMINECTOMY      VASECTOMY          Social History     Tobacco Use    Smoking status: Never    Smokeless tobacco: Never   Substance and Sexual Activity    Alcohol use: Never    Drug use: Never    Sexual activity: Not on file        Current Outpatient Medications   Medication Instructions    biotin 1 mg Cap Take by mouth.    calcium citrate 250 mg    cyanocobalamin 1,000 mcg/mL injection SMARTSI MCG IM    ergocalciferol (ERGOCALCIFEROL) 50,000 Units, Every 7 days    furosemide (LASIX) 20 mg, Every morning    gabapentin (NEURONTIN) 600 mg, 3 times daily    lipase-protease-amylase (CREON) 36,000-114,000- 180,000 unit CpDR TAKE 2 CAPSULES BY MOUTH IN THE MORNING, 2 CAPSULES AT NOON, 2 CAPSULES IN THE EVENING AND 1 WITH EACH SNACK    modafiniL (PROVIGIL) 200 mg, Oral, 2 times daily    multivitamin with minerals tablet 1 tablet    phentermine (ADIPEX-P) 37.5 mg tablet TAKE 1 TABLET BY MOUTH TWICE DAILY    vilazodone (VIIBRYD) 40 mg, Daily    vitamin A 10,000 Units, Daily       Review of patient's allergies indicates:   Allergen Reactions    Oxycodone      Other reaction(s): haullincante    Penicillins Rash        Review of Systems    12 point review of systems conducted, negative except as stated in the history of present illness. See HPI for details.    Objective:     There were no vitals taken for this visit.    Physical Exam      Physical Exam: LIMITED DUE TO TELEMEDICINE RESTRICTIONS.  General: Alert and oriented, No acute distress.  Speech: clear/ fluent  Head: Normocephalic.  Eyes: Sclera non-icteric. EOM intact.  Respiratory: Non-labored respirations, pulmonary effort normal  Musculoskeletal: range of motion grossly normal  Integumentary:  No visible suspicious lesions or rashes. No jaundice or diaphoresis.   Neurologic: No focal deficits  Psychiatric: Normal interaction, Coherent speech, Euthymic mood, Appropriate affect     Assessment:       ICD-10-CM ICD-9-CM   1. JOLENE (obstructive  sleep apnea)  G47.33 327.23   2. Excessive daytime sleepiness  G47.19 780.54   3. Snoring  R06.83 786.09   4. H/O gastric bypass  Z98.84 V45.86      Plan:     REM exacerbated JOLENE.    Reviewed HST, PSG, and PAP titration reports with pt (see detailed results and interpretation scanned into chart).     Suggest autoPAP with pressures from 5-11 cm.    Encouraged nightly use of PAP. Discussed minimum insurance guidelines for PAP use    Reminded pt that drowsy driving may still occur despite PAP use.    Orders Placed This Encounter   Procedures    CPAP FOR HOME USE     Follow up in about 8 weeks (around 4/21/2025) for Virtual Visit, JOLENE follow-up. In addition to their scheduled follow up, the patient has also been instructed to follow up on as needed basis.     Future Appointments   Date Time Provider Department Center   4/14/2025 10:30 AM Ginette Pedro, SAMMY [facility] SLEEP [facility]        Video Time Documentation:  Spent 10 minutes with patient face to face discussed health concerns. More than 50% of this time was spent in counseling and coordination of care.    Ginette Pedro, MSN, APRN, FNP-C  [facility]  [phone]

## 2025-05-27 DIAGNOSIS — G47.33 OSA (OBSTRUCTIVE SLEEP APNEA): ICD-10-CM

## 2025-05-27 DIAGNOSIS — G47.19 EXCESSIVE DAYTIME SLEEPINESS: Primary | ICD-10-CM

## 2025-05-27 DIAGNOSIS — E66.811 OBESITY (BMI 30.0-34.9): Primary | ICD-10-CM

## 2025-05-27 RX ORDER — PHENTERMINE HYDROCHLORIDE 37.5 MG/1
TABLET ORAL
Qty: 60 TABLET | Refills: 4 | Status: SHIPPED | OUTPATIENT
Start: 2025-05-27

## 2025-05-27 RX ORDER — MODAFINIL 200 MG/1
200 TABLET ORAL 2 TIMES DAILY
Qty: 60 TABLET | Refills: 3 | Status: SHIPPED | OUTPATIENT
Start: 2025-05-27

## 2025-08-23 ENCOUNTER — PATIENT MESSAGE (OUTPATIENT)
Dept: RESEARCH | Facility: HOSPITAL | Age: 60
End: 2025-08-23
Payer: MEDICARE